# Patient Record
Sex: MALE | Race: WHITE | NOT HISPANIC OR LATINO | URBAN - METROPOLITAN AREA
[De-identification: names, ages, dates, MRNs, and addresses within clinical notes are randomized per-mention and may not be internally consistent; named-entity substitution may affect disease eponyms.]

---

## 2017-06-12 ENCOUNTER — OUTPATIENT (OUTPATIENT)
Dept: OUTPATIENT SERVICES | Facility: HOSPITAL | Age: 62
LOS: 1 days | End: 2017-06-12

## 2017-06-28 ENCOUNTER — EMERGENCY (EMERGENCY)
Facility: HOSPITAL | Age: 62
LOS: 1 days | End: 2017-06-28
Payer: MEDICARE

## 2017-06-28 PROCEDURE — 71010: CPT | Mod: 26

## 2017-06-28 PROCEDURE — 71250 CT THORAX DX C-: CPT | Mod: 26

## 2017-06-28 PROCEDURE — 99285 EMERGENCY DEPT VISIT HI MDM: CPT

## 2018-01-10 ENCOUNTER — OUTPATIENT (OUTPATIENT)
Dept: OUTPATIENT SERVICES | Facility: HOSPITAL | Age: 63
LOS: 1 days | End: 2018-01-10

## 2018-01-10 ENCOUNTER — INPATIENT (INPATIENT)
Facility: HOSPITAL | Age: 63
LOS: 15 days | Discharge: HOME | End: 2018-01-26
Payer: MEDICARE

## 2018-01-10 PROCEDURE — 99285 EMERGENCY DEPT VISIT HI MDM: CPT

## 2018-01-10 PROCEDURE — 71045 X-RAY EXAM CHEST 1 VIEW: CPT | Mod: 26

## 2018-01-11 ENCOUNTER — OUTPATIENT (OUTPATIENT)
Dept: OUTPATIENT SERVICES | Facility: HOSPITAL | Age: 63
LOS: 1 days | End: 2018-01-11

## 2018-01-12 ENCOUNTER — OUTPATIENT (OUTPATIENT)
Dept: OUTPATIENT SERVICES | Facility: HOSPITAL | Age: 63
LOS: 1 days | End: 2018-01-12

## 2018-01-12 PROCEDURE — 74230 X-RAY XM SWLNG FUNCJ C+: CPT | Mod: 26

## 2018-01-14 ENCOUNTER — OUTPATIENT (OUTPATIENT)
Dept: OUTPATIENT SERVICES | Facility: HOSPITAL | Age: 63
LOS: 1 days | End: 2018-01-14

## 2018-01-15 ENCOUNTER — OUTPATIENT (OUTPATIENT)
Dept: OUTPATIENT SERVICES | Facility: HOSPITAL | Age: 63
LOS: 1 days | End: 2018-01-15

## 2018-01-16 ENCOUNTER — OUTPATIENT (OUTPATIENT)
Dept: OUTPATIENT SERVICES | Facility: HOSPITAL | Age: 63
LOS: 1 days | End: 2018-01-16

## 2018-01-17 ENCOUNTER — OUTPATIENT (OUTPATIENT)
Dept: OUTPATIENT SERVICES | Facility: HOSPITAL | Age: 63
LOS: 1 days | End: 2018-01-17

## 2018-01-18 ENCOUNTER — OUTPATIENT (OUTPATIENT)
Dept: OUTPATIENT SERVICES | Facility: HOSPITAL | Age: 63
LOS: 1 days | End: 2018-01-18

## 2018-01-19 ENCOUNTER — OUTPATIENT (OUTPATIENT)
Dept: OUTPATIENT SERVICES | Facility: HOSPITAL | Age: 63
LOS: 1 days | End: 2018-01-19

## 2018-01-20 ENCOUNTER — OUTPATIENT (OUTPATIENT)
Dept: OUTPATIENT SERVICES | Facility: HOSPITAL | Age: 63
LOS: 1 days | End: 2018-01-20

## 2018-01-21 ENCOUNTER — OUTPATIENT (OUTPATIENT)
Dept: OUTPATIENT SERVICES | Facility: HOSPITAL | Age: 63
LOS: 1 days | End: 2018-01-21

## 2018-01-22 ENCOUNTER — OUTPATIENT (OUTPATIENT)
Dept: OUTPATIENT SERVICES | Facility: HOSPITAL | Age: 63
LOS: 1 days | End: 2018-01-22

## 2018-01-22 PROCEDURE — 71045 X-RAY EXAM CHEST 1 VIEW: CPT | Mod: 26

## 2018-01-23 ENCOUNTER — OUTPATIENT (OUTPATIENT)
Dept: OUTPATIENT SERVICES | Facility: HOSPITAL | Age: 63
LOS: 1 days | End: 2018-01-23

## 2018-01-24 ENCOUNTER — OUTPATIENT (OUTPATIENT)
Dept: OUTPATIENT SERVICES | Facility: HOSPITAL | Age: 63
LOS: 1 days | End: 2018-01-24

## 2018-01-25 ENCOUNTER — OUTPATIENT (OUTPATIENT)
Dept: OUTPATIENT SERVICES | Facility: HOSPITAL | Age: 63
LOS: 1 days | End: 2018-01-25

## 2018-05-08 ENCOUNTER — INPATIENT (INPATIENT)
Facility: HOSPITAL | Age: 63
LOS: 6 days | Discharge: HOME | End: 2018-05-15
Payer: MEDICARE

## 2018-05-08 ENCOUNTER — OUTPATIENT (OUTPATIENT)
Dept: OUTPATIENT SERVICES | Facility: HOSPITAL | Age: 63
LOS: 1 days | End: 2018-05-08

## 2018-05-08 PROCEDURE — 71260 CT THORAX DX C+: CPT | Mod: 26

## 2018-05-08 PROCEDURE — 99285 EMERGENCY DEPT VISIT HI MDM: CPT

## 2018-05-08 PROCEDURE — 71045 X-RAY EXAM CHEST 1 VIEW: CPT | Mod: 26

## 2018-05-08 PROCEDURE — 74177 CT ABD & PELVIS W/CONTRAST: CPT | Mod: 26

## 2018-05-09 ENCOUNTER — OUTPATIENT (OUTPATIENT)
Dept: OUTPATIENT SERVICES | Facility: HOSPITAL | Age: 63
LOS: 1 days | End: 2018-05-09

## 2018-05-09 PROCEDURE — 71045 X-RAY EXAM CHEST 1 VIEW: CPT | Mod: 26

## 2018-05-10 ENCOUNTER — OUTPATIENT (OUTPATIENT)
Dept: OUTPATIENT SERVICES | Facility: HOSPITAL | Age: 63
LOS: 1 days | End: 2018-05-10

## 2018-05-11 ENCOUNTER — OUTPATIENT (OUTPATIENT)
Dept: OUTPATIENT SERVICES | Facility: HOSPITAL | Age: 63
LOS: 1 days | End: 2018-05-11

## 2018-05-11 PROCEDURE — 76937 US GUIDE VASCULAR ACCESS: CPT | Mod: 26

## 2018-05-11 PROCEDURE — 36569 INSJ PICC 5 YR+ W/O IMAGING: CPT

## 2018-05-12 ENCOUNTER — OUTPATIENT (OUTPATIENT)
Dept: OUTPATIENT SERVICES | Facility: HOSPITAL | Age: 63
LOS: 1 days | End: 2018-05-12

## 2018-05-13 ENCOUNTER — OUTPATIENT (OUTPATIENT)
Dept: OUTPATIENT SERVICES | Facility: HOSPITAL | Age: 63
LOS: 1 days | End: 2018-05-13

## 2018-05-14 ENCOUNTER — OUTPATIENT (OUTPATIENT)
Dept: OUTPATIENT SERVICES | Facility: HOSPITAL | Age: 63
LOS: 1 days | End: 2018-05-14

## 2018-05-18 ENCOUNTER — INPATIENT (INPATIENT)
Facility: HOSPITAL | Age: 63
LOS: 4 days | Discharge: HOME | End: 2018-05-23
Payer: MEDICARE

## 2018-05-18 ENCOUNTER — OUTPATIENT (OUTPATIENT)
Dept: OUTPATIENT SERVICES | Facility: HOSPITAL | Age: 63
LOS: 1 days | End: 2018-05-18

## 2018-05-18 PROCEDURE — 71045 X-RAY EXAM CHEST 1 VIEW: CPT | Mod: 26

## 2018-05-18 PROCEDURE — 74176 CT ABD & PELVIS W/O CONTRAST: CPT | Mod: 26

## 2018-05-18 PROCEDURE — 74019 RADEX ABDOMEN 2 VIEWS: CPT | Mod: 26

## 2018-05-18 PROCEDURE — 99285 EMERGENCY DEPT VISIT HI MDM: CPT

## 2018-05-19 ENCOUNTER — OUTPATIENT (OUTPATIENT)
Dept: OUTPATIENT SERVICES | Facility: HOSPITAL | Age: 63
LOS: 1 days | End: 2018-05-19

## 2018-05-20 ENCOUNTER — OUTPATIENT (OUTPATIENT)
Dept: OUTPATIENT SERVICES | Facility: HOSPITAL | Age: 63
LOS: 1 days | End: 2018-05-20

## 2018-05-21 ENCOUNTER — OUTPATIENT (OUTPATIENT)
Dept: OUTPATIENT SERVICES | Facility: HOSPITAL | Age: 63
LOS: 1 days | End: 2018-05-21

## 2018-05-21 PROCEDURE — 74241: CPT | Mod: 26

## 2018-05-22 ENCOUNTER — OUTPATIENT (OUTPATIENT)
Dept: OUTPATIENT SERVICES | Facility: HOSPITAL | Age: 63
LOS: 1 days | End: 2018-05-22

## 2018-05-23 ENCOUNTER — OUTPATIENT (OUTPATIENT)
Dept: OUTPATIENT SERVICES | Facility: HOSPITAL | Age: 63
LOS: 1 days | End: 2018-05-23

## 2018-05-25 ENCOUNTER — OUTPATIENT (OUTPATIENT)
Dept: OUTPATIENT SERVICES | Facility: HOSPITAL | Age: 63
LOS: 1 days | End: 2018-05-25

## 2018-05-25 ENCOUNTER — EMERGENCY (EMERGENCY)
Facility: HOSPITAL | Age: 63
LOS: 1 days | End: 2018-05-25
Payer: MEDICARE

## 2018-05-25 PROCEDURE — 71260 CT THORAX DX C+: CPT | Mod: 26

## 2018-05-25 PROCEDURE — 99285 EMERGENCY DEPT VISIT HI MDM: CPT | Mod: GC

## 2018-05-25 PROCEDURE — 71045 X-RAY EXAM CHEST 1 VIEW: CPT | Mod: 26

## 2018-05-25 PROCEDURE — 74177 CT ABD & PELVIS W/CONTRAST: CPT | Mod: 26

## 2018-05-26 ENCOUNTER — INPATIENT (INPATIENT)
Facility: HOSPITAL | Age: 63
LOS: 3 days | Discharge: NURSING, REHAB OR LONG-TERM CARE | DRG: 177 | End: 2018-05-30
Attending: HOSPITALIST | Admitting: HOSPITALIST
Payer: MEDICARE

## 2018-05-26 VITALS
OXYGEN SATURATION: 100 % | HEART RATE: 130 BPM | RESPIRATION RATE: 22 BRPM | DIASTOLIC BLOOD PRESSURE: 110 MMHG | SYSTOLIC BLOOD PRESSURE: 172 MMHG

## 2018-05-26 DIAGNOSIS — A41.9 SEPSIS, UNSPECIFIED ORGANISM: ICD-10-CM

## 2018-05-26 DIAGNOSIS — K92.2 GASTROINTESTINAL HEMORRHAGE, UNSPECIFIED: ICD-10-CM

## 2018-05-26 DIAGNOSIS — K59.00 CONSTIPATION, UNSPECIFIED: ICD-10-CM

## 2018-05-26 DIAGNOSIS — E83.39 OTHER DISORDERS OF PHOSPHORUS METABOLISM: ICD-10-CM

## 2018-05-26 DIAGNOSIS — F79 UNSPECIFIED INTELLECTUAL DISABILITIES: ICD-10-CM

## 2018-05-26 DIAGNOSIS — J69.0 PNEUMONITIS DUE TO INHALATION OF FOOD AND VOMIT: ICD-10-CM

## 2018-05-26 DIAGNOSIS — N17.9 ACUTE KIDNEY FAILURE, UNSPECIFIED: ICD-10-CM

## 2018-05-26 DIAGNOSIS — K94.23 GASTROSTOMY MALFUNCTION: Chronic | ICD-10-CM

## 2018-05-26 DIAGNOSIS — E83.51 HYPOCALCEMIA: ICD-10-CM

## 2018-05-26 DIAGNOSIS — T17.908A UNSPECIFIED FOREIGN BODY IN RESPIRATORY TRACT, PART UNSPECIFIED CAUSING OTHER INJURY, INITIAL ENCOUNTER: ICD-10-CM

## 2018-05-26 DIAGNOSIS — E87.6 HYPOKALEMIA: ICD-10-CM

## 2018-05-26 LAB
ABO RH CONFIRMATION: SIGNIFICANT CHANGE UP
ALBUMIN SERPL ELPH-MCNC: 2.6 G/DL — LOW (ref 3.3–5.2)
ALBUMIN SERPL ELPH-MCNC: 3.4 G/DL — SIGNIFICANT CHANGE UP (ref 3.3–5.2)
ALP SERPL-CCNC: 60 U/L — SIGNIFICANT CHANGE UP (ref 40–120)
ALP SERPL-CCNC: 82 U/L — SIGNIFICANT CHANGE UP (ref 40–120)
ALT FLD-CCNC: 10 U/L — SIGNIFICANT CHANGE UP
ALT FLD-CCNC: 8 U/L — SIGNIFICANT CHANGE UP
ANION GAP SERPL CALC-SCNC: 11 MMOL/L — SIGNIFICANT CHANGE UP (ref 5–17)
ANION GAP SERPL CALC-SCNC: 16 MMOL/L — SIGNIFICANT CHANGE UP (ref 5–17)
APTT BLD: 23.3 SEC — LOW (ref 27.5–37.4)
APTT BLD: 28.6 SEC — SIGNIFICANT CHANGE UP (ref 27.5–37.4)
AST SERPL-CCNC: 10 U/L — SIGNIFICANT CHANGE UP
AST SERPL-CCNC: 12 U/L — SIGNIFICANT CHANGE UP
BILIRUB SERPL-MCNC: 0.2 MG/DL — LOW (ref 0.4–2)
BILIRUB SERPL-MCNC: 0.3 MG/DL — LOW (ref 0.4–2)
BLD GP AB SCN SERPL QL: SIGNIFICANT CHANGE UP
BUN SERPL-MCNC: 13 MG/DL — SIGNIFICANT CHANGE UP (ref 8–20)
BUN SERPL-MCNC: 23 MG/DL — HIGH (ref 8–20)
CALCIUM SERPL-MCNC: 6.2 MG/DL — CRITICAL LOW (ref 8.6–10.2)
CALCIUM SERPL-MCNC: 8.1 MG/DL — LOW (ref 8.6–10.2)
CHLORIDE SERPL-SCNC: 100 MMOL/L — SIGNIFICANT CHANGE UP (ref 98–107)
CHLORIDE SERPL-SCNC: 112 MMOL/L — HIGH (ref 98–107)
CO2 SERPL-SCNC: 18 MMOL/L — LOW (ref 22–29)
CO2 SERPL-SCNC: 22 MMOL/L — SIGNIFICANT CHANGE UP (ref 22–29)
CREAT SERPL-MCNC: 0.32 MG/DL — LOW (ref 0.5–1.3)
CREAT SERPL-MCNC: 0.42 MG/DL — LOW (ref 0.5–1.3)
EOSINOPHIL # BLD AUTO: 0 K/UL — SIGNIFICANT CHANGE UP (ref 0–0.5)
EOSINOPHIL # BLD AUTO: 0.2 K/UL — SIGNIFICANT CHANGE UP (ref 0–0.5)
EOSINOPHIL NFR BLD AUTO: 0.4 % — SIGNIFICANT CHANGE UP (ref 0–5)
EOSINOPHIL NFR BLD AUTO: 1.9 % — SIGNIFICANT CHANGE UP (ref 0–5)
GLUCOSE SERPL-MCNC: 128 MG/DL — HIGH (ref 70–115)
GLUCOSE SERPL-MCNC: 92 MG/DL — SIGNIFICANT CHANGE UP (ref 70–115)
HCT VFR BLD CALC: 26 % — LOW (ref 42–52)
HCT VFR BLD CALC: 31.7 % — LOW (ref 42–52)
HGB BLD-MCNC: 10 G/DL — LOW (ref 14–18)
HGB BLD-MCNC: 8.2 G/DL — LOW (ref 14–18)
INR BLD: 1.17 RATIO — HIGH (ref 0.88–1.16)
INR BLD: 1.28 RATIO — HIGH (ref 0.88–1.16)
LACTATE BLDV-MCNC: 1.7 MMOL/L — SIGNIFICANT CHANGE UP (ref 0.5–2)
LYMPHOCYTES # BLD AUTO: 1.2 K/UL — SIGNIFICANT CHANGE UP (ref 1–4.8)
LYMPHOCYTES # BLD AUTO: 1.3 K/UL — SIGNIFICANT CHANGE UP (ref 1–4.8)
LYMPHOCYTES # BLD AUTO: 13.3 % — LOW (ref 20–55)
LYMPHOCYTES # BLD AUTO: 13.3 % — LOW (ref 20–55)
MAGNESIUM SERPL-MCNC: 1.3 MG/DL — LOW (ref 1.6–2.6)
MCHC RBC-ENTMCNC: 27.5 PG — SIGNIFICANT CHANGE UP (ref 27–31)
MCHC RBC-ENTMCNC: 27.6 PG — SIGNIFICANT CHANGE UP (ref 27–31)
MCHC RBC-ENTMCNC: 31.5 G/DL — LOW (ref 32–36)
MCHC RBC-ENTMCNC: 31.5 G/DL — LOW (ref 32–36)
MCV RBC AUTO: 87.3 FL — SIGNIFICANT CHANGE UP (ref 80–94)
MCV RBC AUTO: 87.5 FL — SIGNIFICANT CHANGE UP (ref 80–94)
MONOCYTES # BLD AUTO: 1.1 K/UL — HIGH (ref 0–0.8)
MONOCYTES # BLD AUTO: 1.4 K/UL — HIGH (ref 0–0.8)
MONOCYTES NFR BLD AUTO: 12.1 % — HIGH (ref 3–10)
MONOCYTES NFR BLD AUTO: 14.9 % — HIGH (ref 3–10)
NEUTROPHILS # BLD AUTO: 6.7 K/UL — SIGNIFICANT CHANGE UP (ref 1.8–8)
NEUTROPHILS # BLD AUTO: 6.9 K/UL — SIGNIFICANT CHANGE UP (ref 1.8–8)
NEUTROPHILS NFR BLD AUTO: 71.1 % — SIGNIFICANT CHANGE UP (ref 37–73)
NEUTROPHILS NFR BLD AUTO: 72.5 % — SIGNIFICANT CHANGE UP (ref 37–73)
PHOSPHATE SERPL-MCNC: 1.4 MG/DL — LOW (ref 2.4–4.7)
PLATELET # BLD AUTO: 248 K/UL — SIGNIFICANT CHANGE UP (ref 150–400)
PLATELET # BLD AUTO: 314 K/UL — SIGNIFICANT CHANGE UP (ref 150–400)
POTASSIUM SERPL-MCNC: 3 MMOL/L — LOW (ref 3.5–5.3)
POTASSIUM SERPL-MCNC: 3.7 MMOL/L — SIGNIFICANT CHANGE UP (ref 3.5–5.3)
POTASSIUM SERPL-SCNC: 3 MMOL/L — LOW (ref 3.5–5.3)
POTASSIUM SERPL-SCNC: 3.7 MMOL/L — SIGNIFICANT CHANGE UP (ref 3.5–5.3)
PROCALCITONIN SERPL-MCNC: <0.05 NG/ML — SIGNIFICANT CHANGE UP (ref 0–0.04)
PROT SERPL-MCNC: 4.5 G/DL — LOW (ref 6.6–8.7)
PROT SERPL-MCNC: 6.1 G/DL — LOW (ref 6.6–8.7)
PROTHROM AB SERPL-ACNC: 12.9 SEC — HIGH (ref 9.8–12.7)
PROTHROM AB SERPL-ACNC: 14.2 SEC — HIGH (ref 9.8–12.7)
RAPID RVP RESULT: SIGNIFICANT CHANGE UP
RBC # BLD: 2.97 M/UL — LOW (ref 4.6–6.2)
RBC # BLD: 3.63 M/UL — LOW (ref 4.6–6.2)
RBC # FLD: 14.6 % — SIGNIFICANT CHANGE UP (ref 11–15.6)
RBC # FLD: 14.6 % — SIGNIFICANT CHANGE UP (ref 11–15.6)
SODIUM SERPL-SCNC: 138 MMOL/L — SIGNIFICANT CHANGE UP (ref 135–145)
SODIUM SERPL-SCNC: 141 MMOL/L — SIGNIFICANT CHANGE UP (ref 135–145)
TYPE + AB SCN PNL BLD: SIGNIFICANT CHANGE UP
WBC # BLD: 9.2 K/UL — SIGNIFICANT CHANGE UP (ref 4.8–10.8)
WBC # BLD: 9.7 K/UL — SIGNIFICANT CHANGE UP (ref 4.8–10.8)
WBC # FLD AUTO: 9.2 K/UL — SIGNIFICANT CHANGE UP (ref 4.8–10.8)
WBC # FLD AUTO: 9.7 K/UL — SIGNIFICANT CHANGE UP (ref 4.8–10.8)

## 2018-05-26 PROCEDURE — 99223 1ST HOSP IP/OBS HIGH 75: CPT

## 2018-05-26 PROCEDURE — 36556 INSERT NON-TUNNEL CV CATH: CPT

## 2018-05-26 PROCEDURE — 93010 ELECTROCARDIOGRAM REPORT: CPT

## 2018-05-26 PROCEDURE — 12345: CPT | Mod: NC

## 2018-05-26 PROCEDURE — 99291 CRITICAL CARE FIRST HOUR: CPT | Mod: 25

## 2018-05-26 PROCEDURE — 71045 X-RAY EXAM CHEST 1 VIEW: CPT | Mod: 26

## 2018-05-26 RX ORDER — CHOLECALCIFEROL (VITAMIN D3) 125 MCG
1000 CAPSULE ORAL DAILY
Qty: 0 | Refills: 0 | Status: DISCONTINUED | OUTPATIENT
Start: 2018-05-26 | End: 2018-05-30

## 2018-05-26 RX ORDER — LAMOTRIGINE 25 MG/1
100 TABLET, ORALLY DISINTEGRATING ORAL DAILY
Qty: 0 | Refills: 0 | Status: DISCONTINUED | OUTPATIENT
Start: 2018-05-26 | End: 2018-05-30

## 2018-05-26 RX ORDER — SODIUM CHLORIDE 9 MG/ML
2000 INJECTION INTRAMUSCULAR; INTRAVENOUS; SUBCUTANEOUS ONCE
Qty: 0 | Refills: 0 | Status: COMPLETED | OUTPATIENT
Start: 2018-05-26 | End: 2018-05-26

## 2018-05-26 RX ORDER — SODIUM CHLORIDE 9 MG/ML
1000 INJECTION INTRAMUSCULAR; INTRAVENOUS; SUBCUTANEOUS ONCE
Qty: 0 | Refills: 0 | Status: COMPLETED | OUTPATIENT
Start: 2018-05-26 | End: 2018-05-26

## 2018-05-26 RX ORDER — IPRATROPIUM/ALBUTEROL SULFATE 18-103MCG
3 AEROSOL WITH ADAPTER (GRAM) INHALATION EVERY 6 HOURS
Qty: 0 | Refills: 0 | Status: DISCONTINUED | OUTPATIENT
Start: 2018-05-26 | End: 2018-05-30

## 2018-05-26 RX ORDER — POTASSIUM CHLORIDE 20 MEQ
40 PACKET (EA) ORAL EVERY 4 HOURS
Qty: 0 | Refills: 0 | Status: COMPLETED | OUTPATIENT
Start: 2018-05-26 | End: 2018-05-26

## 2018-05-26 RX ORDER — MAGNESIUM SULFATE 500 MG/ML
2 VIAL (ML) INJECTION EVERY 4 HOURS
Qty: 0 | Refills: 0 | Status: COMPLETED | OUTPATIENT
Start: 2018-05-26 | End: 2018-05-26

## 2018-05-26 RX ORDER — LAMOTRIGINE 25 MG/1
50 TABLET, ORALLY DISINTEGRATING ORAL AT BEDTIME
Qty: 0 | Refills: 0 | Status: DISCONTINUED | OUTPATIENT
Start: 2018-05-26 | End: 2018-05-30

## 2018-05-26 RX ORDER — DOCUSATE SODIUM 100 MG
200 CAPSULE ORAL
Qty: 0 | Refills: 0 | Status: DISCONTINUED | OUTPATIENT
Start: 2018-05-26 | End: 2018-05-26

## 2018-05-26 RX ORDER — SENNA PLUS 8.6 MG/1
2 TABLET ORAL AT BEDTIME
Qty: 0 | Refills: 0 | Status: DISCONTINUED | OUTPATIENT
Start: 2018-05-26 | End: 2018-05-30

## 2018-05-26 RX ORDER — ASCORBIC ACID 60 MG
500 TABLET,CHEWABLE ORAL DAILY
Qty: 0 | Refills: 0 | Status: DISCONTINUED | OUTPATIENT
Start: 2018-05-26 | End: 2018-05-30

## 2018-05-26 RX ORDER — SODIUM,POTASSIUM PHOSPHATES 278-250MG
1 POWDER IN PACKET (EA) ORAL ONCE
Qty: 0 | Refills: 0 | Status: COMPLETED | OUTPATIENT
Start: 2018-05-26 | End: 2018-05-26

## 2018-05-26 RX ORDER — FERROUS SULFATE 325(65) MG
300 TABLET ORAL DAILY
Qty: 0 | Refills: 0 | Status: DISCONTINUED | OUTPATIENT
Start: 2018-05-26 | End: 2018-05-30

## 2018-05-26 RX ORDER — POLYETHYLENE GLYCOL 3350 17 G/17G
17 POWDER, FOR SOLUTION ORAL DAILY
Qty: 0 | Refills: 0 | Status: DISCONTINUED | OUTPATIENT
Start: 2018-05-26 | End: 2018-05-30

## 2018-05-26 RX ORDER — SODIUM CHLORIDE 9 MG/ML
1000 INJECTION, SOLUTION INTRAVENOUS
Qty: 0 | Refills: 0 | Status: DISCONTINUED | OUTPATIENT
Start: 2018-05-26 | End: 2018-05-30

## 2018-05-26 RX ORDER — PHENOBARBITAL 60 MG
32.4 TABLET ORAL
Qty: 0 | Refills: 0 | Status: DISCONTINUED | OUTPATIENT
Start: 2018-05-26 | End: 2018-05-30

## 2018-05-26 RX ORDER — LACTOBACILLUS ACIDOPHILUS 100MM CELL
1 CAPSULE ORAL DAILY
Qty: 0 | Refills: 0 | Status: DISCONTINUED | OUTPATIENT
Start: 2018-05-26 | End: 2018-05-30

## 2018-05-26 RX ORDER — ONDANSETRON 8 MG/1
4 TABLET, FILM COATED ORAL EVERY 6 HOURS
Qty: 0 | Refills: 0 | Status: DISCONTINUED | OUTPATIENT
Start: 2018-05-26 | End: 2018-05-30

## 2018-05-26 RX ORDER — PANTOPRAZOLE SODIUM 20 MG/1
8 TABLET, DELAYED RELEASE ORAL
Qty: 80 | Refills: 0 | Status: DISCONTINUED | OUTPATIENT
Start: 2018-05-26 | End: 2018-05-27

## 2018-05-26 RX ORDER — CALCIUM CARBONATE 500(1250)
1250 TABLET ORAL THREE TIMES A DAY
Qty: 0 | Refills: 0 | Status: DISCONTINUED | OUTPATIENT
Start: 2018-05-26 | End: 2018-05-30

## 2018-05-26 RX ORDER — ACETAMINOPHEN 500 MG
650 TABLET ORAL EVERY 6 HOURS
Qty: 0 | Refills: 0 | Status: DISCONTINUED | OUTPATIENT
Start: 2018-05-26 | End: 2018-05-30

## 2018-05-26 RX ADMIN — Medication 100 MILLIGRAM(S): at 06:11

## 2018-05-26 RX ADMIN — Medication 500 MILLIGRAM(S): at 12:55

## 2018-05-26 RX ADMIN — Medication 50 GRAM(S): at 12:39

## 2018-05-26 RX ADMIN — Medication 3 MILLILITER(S): at 20:53

## 2018-05-26 RX ADMIN — SODIUM CHLORIDE 1000 MILLILITER(S): 9 INJECTION INTRAMUSCULAR; INTRAVENOUS; SUBCUTANEOUS at 02:34

## 2018-05-26 RX ADMIN — LAMOTRIGINE 50 MILLIGRAM(S): 25 TABLET, ORALLY DISINTEGRATING ORAL at 23:05

## 2018-05-26 RX ADMIN — Medication 32.4 MILLIGRAM(S): at 17:46

## 2018-05-26 RX ADMIN — Medication 1250 MILLIGRAM(S): at 13:14

## 2018-05-26 RX ADMIN — SENNA PLUS 2 TABLET(S): 8.6 TABLET ORAL at 23:07

## 2018-05-26 RX ADMIN — Medication 100 MILLIGRAM(S): at 13:09

## 2018-05-26 RX ADMIN — Medication 3 MILLILITER(S): at 16:23

## 2018-05-26 RX ADMIN — Medication 50 GRAM(S): at 17:40

## 2018-05-26 RX ADMIN — LAMOTRIGINE 100 MILLIGRAM(S): 25 TABLET, ORALLY DISINTEGRATING ORAL at 12:55

## 2018-05-26 RX ADMIN — Medication 1 TABLET(S): at 12:55

## 2018-05-26 RX ADMIN — PANTOPRAZOLE SODIUM 10 MG/HR: 20 TABLET, DELAYED RELEASE ORAL at 16:23

## 2018-05-26 RX ADMIN — Medication 40 MILLIEQUIVALENT(S): at 13:20

## 2018-05-26 RX ADMIN — Medication 1000 UNIT(S): at 16:26

## 2018-05-26 RX ADMIN — Medication 1250 MILLIGRAM(S): at 23:06

## 2018-05-26 RX ADMIN — Medication 3 MILLILITER(S): at 09:01

## 2018-05-26 RX ADMIN — Medication 40 MILLIEQUIVALENT(S): at 23:12

## 2018-05-26 RX ADMIN — Medication 300 MILLIGRAM(S): at 12:51

## 2018-05-26 RX ADMIN — PANTOPRAZOLE SODIUM 10 MG/HR: 20 TABLET, DELAYED RELEASE ORAL at 04:47

## 2018-05-26 RX ADMIN — Medication 1 TABLET(S): at 12:58

## 2018-05-26 RX ADMIN — Medication 100 MILLIGRAM(S): at 23:06

## 2018-05-26 RX ADMIN — SODIUM CHLORIDE 125 MILLILITER(S): 9 INJECTION, SOLUTION INTRAVENOUS at 11:30

## 2018-05-26 RX ADMIN — POLYETHYLENE GLYCOL 3350 17 GRAM(S): 17 POWDER, FOR SOLUTION ORAL at 12:50

## 2018-05-26 RX ADMIN — Medication 32.4 MILLIGRAM(S): at 06:34

## 2018-05-26 RX ADMIN — Medication 40 MILLIEQUIVALENT(S): at 17:46

## 2018-05-26 RX ADMIN — SODIUM CHLORIDE 2000 MILLILITER(S): 9 INJECTION INTRAMUSCULAR; INTRAVENOUS; SUBCUTANEOUS at 04:47

## 2018-05-26 RX ADMIN — Medication 1 TABLET(S): at 12:54

## 2018-05-26 NOTE — H&P ADULT - PROBLEM SELECTOR PLAN 1
admit to monitored +  lactate now <2, cont IVF hydration  tachycardia resolved, cont monitor  EKG reviewed, no STT elevations  pt placed on clindamycin for concern of aspiration PNA  fall and seizure precautions  blood, urine, sputum cultures  RVP ordered

## 2018-05-26 NOTE — H&P ADULT - HISTORY OF PRESENT ILLNESS
Pt is a 61 y/o Male with pmh/o non-verbal and handicap at baseline, who lives at group home and is accompanied by aide, who presents to ED as a transfer from [facility] after pt experienced dark colored, concern for bloody coffee ground emesis which was also note from PEG tube. History taken largely from ED physician who reports one additonal episode in ED. As per aide at bedside, pt never deviated from baseline mentation or behaviors. In ED prior to transfer pt received IV levaquin and zosyn and IVF, lactate originally 4.5, now to 3.5 and less than 2 in [facility] ED after additonal IVF. At [place] CT A/P performed and showed no acute intra-abdominal pathology however did show bibasilar consolidations. Aide at bedside notes concern for aspiration of emesis during episode which preceded ED visit. Pt arrived tachycardic in 120's with fever both which resolved after IVF and abx. Pt is a 61 y/o Male with pmh/o non-verbal and handicap at baseline, CP, MR, GERD, with two recent admissions for gastritis/PNA at [place], who lives at group home and is accompanied by aide, who presents to ED as a transfer from [facility] after pt experienced dark colored, concern for bloody coffee ground emesis which was also note from PEG tube. History taken largely from ED physician who reports one additional episode in ED. As per aide at bedside, pt never deviated from baseline mentation or behaviors. In ED prior to transfer pt received IV levaquin and zosyn and IVF, lactate originally 4.5, now to 3.5 and less than 2 in [facility] ED after additional IVF. At [facility] CT A/P performed and showed no acute intra-abdominal pathology however did show bibasilar consolidations. Aide at bedside notes concern for aspiration of emesis during episode which preceded ED visit. Pt arrived tachycardic in 120's with fever both which resolved after IVF and abx.

## 2018-05-26 NOTE — ED PROCEDURE NOTE - CPROC ED INFORMED CONSENT1
with son of patient/Benefits, risks, and possible complications of procedure explained to patient/caregiver who verbalized understanding and gave verbal consent.

## 2018-05-26 NOTE — ED PROVIDER NOTE - MUSCULOSKELETAL, MLM
Spine appears normal, range of motion is not limited, patient is moving all extremities. LE are contracted.

## 2018-05-26 NOTE — ED PROVIDER NOTE - UNABLE TO OBTAIN
Unable to obtain complete hx because patient is non-verbal at baseline Severe Illness/Injury Unable to contain complete ROS because patient is non-verbal at baseline

## 2018-05-26 NOTE — H&P ADULT - PROBLEM SELECTOR PLAN 5
cont colace  moderate amount of stool noted on Xray in [place]  consider miralax/laxative pending GI eval

## 2018-05-26 NOTE — ED PROVIDER NOTE - CRITICAL CARE PROVIDED
direct patient care (not related to procedure)/interpretation of diagnostic studies/documentation/additional history taking/telephone consultation with the patient's family

## 2018-05-26 NOTE — H&P ADULT - PMH
Cerebral palsy, unspecified type    Constipation, unspecified constipation type    Dysphagia, unspecified type    Gastritis, presence of bleeding unspecified, unspecified chronicity, unspecified gastritis type    Mental retardation    Pneumonia due to infectious organism, unspecified laterality, unspecified part of lung

## 2018-05-26 NOTE — H&P ADULT - EXTREMITIES COMMENTS
contracted without any cellulitis, open sores/wounds, erythema  R shoulder at baseline with partial dislocation as per aide

## 2018-05-26 NOTE — CONSULT NOTE ADULT - PROBLEM SELECTOR RECOMMENDATION 9
Hemodynamically stable. NPO for now. IV Pantoprazole Not presently a candidate for EGD. Repeat labs ordered for the AM.

## 2018-05-26 NOTE — CONSULT NOTE ADULT - SUBJECTIVE AND OBJECTIVE BOX
Patient is a 62y old  Male who presents with a chief complaint of sent from [facility] for GI bleed from PEG (26 May 2018 03:41)      HPI:  Pt is a 63 y/o Male with pmh/o non-verbal and handicap at baseline, CP, MR, GERD, with two recent admissions for gastritis/PNA at [facility], who lives at group home and is accompanied by aide, who presents to ED as a transfer from [facility] after pt experienced dark colored, concern for bloody coffee ground emesis which was also note from PEG tube. History taken largely from ED physician who reports one additional episode in ED. As per aide at bedside, pt never deviated from baseline mentation or behaviors. In ED prior to transfer pt received IV levaquin and zosyn and IVF, lactate originally 4.5, now to 3.5 and less than 2 in [facility] ED after additional IVF. At [facility] CT A/P performed and showed no acute intra-abdominal pathology however did show bibasilar consolidations. Aide at bedside notes concern for aspiration of emesis during episode which preceded ED visit. Pt arrived tachycardic in 120's with fever both which resolved after IVF and abx. (26 May 2018 03:41). Unable to get hx from pt.      REVIEW OF SYSTEMS: Unobtainable    PAST MEDICAL & SURGICAL HISTORY:  Constipation, unspecified constipation type  Dysphagia, unspecified type  Pneumonia due to infectious organism, unspecified laterality, unspecified part of lung  Gastritis, presence of bleeding unspecified, unspecified chronicity, unspecified gastritis type  Mental retardation  Cerebral palsy, unspecified type, End stage  PEG tube malfunction      FAMILY HISTORY:  No pertinent family history in first degree relatives      SOCIAL HISTORY:  Smoking Status: [ ] Current, [ ] Former, [ x] Never  Pack Years: N/A, No ETOH or drug abuse history.    MEDICATIONS:  MEDICATIONS  (STANDING):  ALBUTerol/ipratropium for Nebulization 3 milliLiter(s) Nebulizer every 6 hours  ascorbic acid 500 milliGRAM(s) Oral daily  clindamycin IVPB      clindamycin IVPB 600 milliGRAM(s) IV Intermittent every 8 hours  docusate sodium 200 milliGRAM(s) Oral two times a day  ferrous    sulfate Liquid 300 milliGRAM(s) Enteral Tube daily  lactated ringers. 1000 milliLiter(s) (125 mL/Hr) IV Continuous <Continuous>  lactobacillus acidophilus 1 Tablet(s) Oral daily  lamoTRIgine 100 milliGRAM(s) Oral daily  lamoTRIgine 50 milliGRAM(s) Oral at bedtime  pantoprazole Infusion 8 mG/Hr (10 mL/Hr) IV Continuous <Continuous>  PHENobarbital 32.4 milliGRAM(s) Oral two times a day    MEDICATIONS  (PRN):  acetaminophen   Tablet 650 milliGRAM(s) Oral every 6 hours PRN For Temp greater than 38 C (100.4 F)  ondansetron Injectable 4 milliGRAM(s) IV Push every 6 hours PRN Nausea      Allergies    Fish Products (Unknown)  No Known Drug Allergies    Intolerances        Vital Signs Last 24 Hrs  T(C): 37.7 (26 May 2018 01:07), Max: 37.7 (26 May 2018 01:07)  T(F): 99.9 (26 May 2018 01:07), Max: 99.9 (26 May 2018 01:07)  HR: 120 (26 May 2018 01:55) (119 - 130)  BP: 122/91 (26 May 2018 01:55) (110/74 - 172/110)  BP(mean): --  RR: 22 (26 May 2018 01:55) (20 - 24)  SpO2: 98% (26 May 2018 01:55) (98% - 100%)        PHYSICAL EXAM:    General: Well developed; contracted, in no acute distress  HEENT: MMM, conjunctiva pink and sclera anicteric.  Lungs: Decreased breath sounds bilaterally.  Cor: RRR S1, S2 only.  Abdomen: Soft,  obese, G-tube in place, non-tender non-distended; Normal bowel sounds; No rebound or guarding or HSM.  GLORIA: Brown stool  Extremities: Normal range of motion, No clubbing, cyanosis or edema  Neurological: Alert and oriented x3  Skin: Warm and dry. No obvious rash      LABS:                        10.0   9.7   )-----------( 314      ( 26 May 2018 02:15 )             31.7     05-26    138  |  100  |  23.0<H>  ----------------------------<  128<H>  3.7   |  22.0  |  0.42<L>    Ca    8.1<L>      26 May 2018 02:15    TPro  6.1<L>  /  Alb  3.4  /  TBili  0.3<L>  /  DBili  x   /  AST  12  /  ALT  10  /  AlkPhos  82  05-26          RADIOLOGY & ADDITIONAL STUDIES:

## 2018-05-26 NOTE — H&P ADULT - ASSESSMENT
61 yo male with pmh/o gastritis and pneumonia, admitted for sepsis secondary to likely aspiration PNA with failed outpt tx on augmentin and upper GIB likely due to gastritis in setting of abx use and stress rxn.

## 2018-05-26 NOTE — ED PROCEDURE NOTE - CPROC ED INFUS LINE DETAIL1
The location was identified, and the area was draped and prepped./The guidewire was recovered./All lumen(s) aspirated and flushed without difficulty./Ultrasound guidance was used during placement.

## 2018-05-26 NOTE — ED ADULT NURSE REASSESSMENT NOTE - NS ED NURSE REASSESS COMMENT FT1
Pt arrived w/ 20G IV to left AC from previous facility, unsuccessful attempts at IV upon arrival to ED, MD Banks to place central line, pt has not vomited since arrival to ED, code team 1 remains at bedside.

## 2018-05-26 NOTE — PROGRESS NOTE ADULT - PROBLEM SELECTOR PLAN 1
No further bleeding, on protonix drip,  consider converting drip to iv push and resuming feeds in am  Monitor hgb

## 2018-05-26 NOTE — ED ADULT TRIAGE NOTE - CHIEF COMPLAINT QUOTE
pt transfer from [place], nonverbal, sent to [facility] for GI Bleed and vomiting coffee ground emesis. MD Quintanilla @ bedside for eval. code team called to bedside

## 2018-05-26 NOTE — ED PROVIDER NOTE - CARE PLAN
Principal Discharge DX:	Upper GI bleed  Secondary Diagnosis:	Aspiration into airway, initial encounter  Secondary Diagnosis:	Sepsis, due to unspecified organism

## 2018-05-26 NOTE — ED PROVIDER NOTE - OBJECTIVE STATEMENT
63 y/o M non-verbal, presents to ED c/o "coffee ground" appearing emesis which is also coming from his PEG tube. As per EMS, the group home was concerned for possible aspiration. Staff at group home reports patient has been acting appropriately and at baseline.    Patient was transferred from [place] for GI consult. 61 y/o M non-verbal and handicap at baseline, presents to ED c/o "coffee ground" appearing emesis which is also coming from his PEG tube. As per EMS, the group home was concerned for possible aspiration. Staff at group home reports patient has been acting appropriately and at baseline.    Patient was transferred from [place] for GI consult. 61 y/o M non-verbal and handicap at baseline, presents to ED c/o "coffee ground" appearing emesis which is also coming from his PEG tube. As per EMS, the group home was concerned for possible aspiration. Staff at group home reports patient has been acting appropriately and at baseline.  Pt received levaquin and zosyn at [place]. [place] initally 4.5, went to 3.5 with IVF. CT A/P showed no abd pathology but bibasilar infiltrates R>L consistent with aspiration.    Patient was transferred from [place] for GI consult.

## 2018-05-26 NOTE — PROGRESS NOTE ADULT - PROBLEM SELECTOR PLAN 4
Assess ionized calcium, ekg stat to evaluate for qt prolongation, pth and vitamin d levels,   replace calcium and repeat level in am.

## 2018-05-26 NOTE — PROGRESS NOTE ADULT - SUBJECTIVE AND OBJECTIVE BOX
MARINA LEAVITT     Chief Complaint: Patient is a 62y old  Male who presents with a chief complaint of sent from [place] for GI bleed from PEG (26 May 2018 03:41)      PAST MEDICAL & SURGICAL HISTORY:  Constipation, unspecified constipation type  Dysphagia, unspecified type  Pneumonia due to infectious organism, unspecified laterality, unspecified part of lung  Gastritis, presence of bleeding unspecified, unspecified chronicity, unspecified gastritis type  Mental retardation  Cerebral palsy, unspecified type  PEG tube malfunction      HPI/OVERNIGHT EVENTS: Patient non verbal. Labs consistent with profound hypocalcemia, hypomagnesemia, hypokalemia.  EKG stat and aggressive replacement ordered.    MEDICATIONS  (STANDING):  ALBUTerol/ipratropium for Nebulization 3 milliLiter(s) Nebulizer every 6 hours  ascorbic acid 500 milliGRAM(s) Oral daily  calcium carbonate Suspension 500 milliGRAM(s) Oral three times a day  clindamycin IVPB      clindamycin IVPB 600 milliGRAM(s) IV Intermittent every 8 hours  ferrous    sulfate Liquid 300 milliGRAM(s) Enteral Tube daily  lactated ringers. 1000 milliLiter(s) (125 mL/Hr) IV Continuous <Continuous>  lactobacillus acidophilus 1 Tablet(s) Oral daily  lamoTRIgine 100 milliGRAM(s) Oral daily  lamoTRIgine 50 milliGRAM(s) Oral at bedtime  magnesium sulfate  IVPB 2 Gram(s) IV Intermittent every 4 hours  pantoprazole Infusion 8 mG/Hr (10 mL/Hr) IV Continuous <Continuous>  PHENobarbital 32.4 milliGRAM(s) Oral two times a day  polyethylene glycol 3350 17 Gram(s) Oral daily  potassium acid phosphate/sodium acid phosphate tablet (K-PHOS No. 2) 1 Tablet(s) Oral once  potassium chloride   Powder 40 milliEquivalent(s) Oral every 4 hours  senna 2 Tablet(s) Oral at bedtime      Vital Signs Last 24 Hrs  T(C): 36.7 (26 May 2018 08:30), Max: 37.7 (26 May 2018 01:07)  T(F): 98.1 (26 May 2018 08:30), Max: 99.9 (26 May 2018 01:07)  HR: 105 (26 May 2018 09:01) (99 - 130)  BP: 124/72 (26 May 2018 08:30) (110/74 - 172/110)  BP(mean): --  RR: 19 (26 May 2018 08:30) (19 - 24)  SpO2: 99% (26 May 2018 09:01) (98% - 100%)    PHYSICAL EXAM:  Constitutional: mentally challenged  HEENT: PERRLA, EOMI, Normal Hearing, MMM  Neck: No LAD, No JVD  Back: Normal spine flexure, No CVA tenderness  Respiratory: CTAB Cardiovascular: S1 and S2, RRR, no M/G/R  Gastrointestinal: BS+, soft, NT/ND  Extremities: No peripheral edema  Vascular: 2+ peripheral pulses  Neurological: non verbal, does not follow commands.     CAPILLARY BLOOD GLUCOSE    LABS:                        8.2    9.2   )-----------( 248      ( 26 May 2018 09:18 )             26.0     05-26    141  |  112<H>  |  13.0  ----------------------------<  92  3.0<L>   |  18.0<L>  |  0.32<L>    Ca    6.2<LL>      26 May 2018 09:18  Phos  1.4     05-26  Mg     1.3     05-26    TPro  4.5<L>  /  Alb  2.6<L>  /  TBili  0.2<L>  /  DBili  x   /  AST  10  /  ALT  8   /  AlkPhos  60  05-26    PT/INR - ( 26 May 2018 02:15 )   PT: 12.9 sec;   INR: 1.17 ratio         PTT - ( 26 May 2018 02:15 )  PTT:28.6 sec      RADIOLOGY & ADDITIONAL TESTS: MARINA LEAVITT     Chief Complaint: Patient is a 62y old  Male who presents with a chief complaint of sent from [place] for GI bleed from PEG (26 May 2018 03:41)      PAST MEDICAL & SURGICAL HISTORY:  Constipation, unspecified constipation type  Dysphagia, unspecified type  Pneumonia due to infectious organism, unspecified laterality, unspecified part of lung  Gastritis, presence of bleeding unspecified, unspecified chronicity, unspecified gastritis type  Mental retardation  Cerebral palsy, unspecified type  PEG tube malfunction      HPI/OVERNIGHT EVENTS: Patient non verbal. Labs consistent with profound hypocalcemia, hypomagnesemia, hypokalemia.  EKG stat and aggressive replacement ordered.    MEDICATIONS  (STANDING):  ALBUTerol/ipratropium for Nebulization 3 milliLiter(s) Nebulizer every 6 hours  ascorbic acid 500 milliGRAM(s) Oral daily  calcium carbonate Suspension 500 milliGRAM(s) Oral three times a day  clindamycin IVPB      clindamycin IVPB 600 milliGRAM(s) IV Intermittent every 8 hours  ferrous    sulfate Liquid 300 milliGRAM(s) Enteral Tube daily  lactated ringers. 1000 milliLiter(s) (125 mL/Hr) IV Continuous <Continuous>  lactobacillus acidophilus 1 Tablet(s) Oral daily  lamoTRIgine 100 milliGRAM(s) Oral daily  lamoTRIgine 50 milliGRAM(s) Oral at bedtime  magnesium sulfate  IVPB 2 Gram(s) IV Intermittent every 4 hours  pantoprazole Infusion 8 mG/Hr (10 mL/Hr) IV Continuous <Continuous>  PHENobarbital 32.4 milliGRAM(s) Oral two times a day  polyethylene glycol 3350 17 Gram(s) Oral daily  potassium acid phosphate/sodium acid phosphate tablet (K-PHOS No. 2) 1 Tablet(s) Oral once  potassium chloride   Powder 40 milliEquivalent(s) Oral every 4 hours  senna 2 Tablet(s) Oral at bedtime      Vital Signs Last 24 Hrs  T(C): 36.7 (26 May 2018 08:30), Max: 37.7 (26 May 2018 01:07)  T(F): 98.1 (26 May 2018 08:30), Max: 99.9 (26 May 2018 01:07)  HR: 105 (26 May 2018 09:01) (99 - 130)  BP: 124/72 (26 May 2018 08:30) (110/74 - 172/110)  BP(mean): --  RR: 19 (26 May 2018 08:30) (19 - 24)  SpO2: 99% (26 May 2018 09:01) (98% - 100%)    PHYSICAL EXAM:  Constitutional: mentally challenged  HEENT: PERRLA, EOMI, Normal Hearing, MMM  Neck: No LAD, No JVD  Back: Normal spine flexure, No CVA tenderness  Respiratory: CTAB Cardiovascular: S1 and S2, RRR, no M/G/R  Gastrointestinal: peg  Extremities: No peripheral edema  Vascular: 2+ peripheral pulses  Neurological: non verbal, does not follow commands.     CAPILLARY BLOOD GLUCOSE    LABS:                        8.2    9.2   )-----------( 248      ( 26 May 2018 09:18 )             26.0     05-26    141  |  112<H>  |  13.0  ----------------------------<  92  3.0<L>   |  18.0<L>  |  0.32<L>    Ca    6.2<LL>      26 May 2018 09:18  Phos  1.4     05-26  Mg     1.3     05-26    TPro  4.5<L>  /  Alb  2.6<L>  /  TBili  0.2<L>  /  DBili  x   /  AST  10  /  ALT  8   /  AlkPhos  60  05-26    PT/INR - ( 26 May 2018 02:15 )   PT: 12.9 sec;   INR: 1.17 ratio         PTT - ( 26 May 2018 02:15 )  PTT:28.6 sec      RADIOLOGY & ADDITIONAL TESTS:

## 2018-05-26 NOTE — PROGRESS NOTE ADULT - ASSESSMENT
62 year old male mentally challenged male resides in group home on peg feeds with GI bleed, aspiration pneumonia, profound electrolyte deficiencies including hypocalcemia, hypokalemia, hypomagnesemia, and hypophosphatemia. Tube feeds currently held. 62 year old male mentally challenged male resides in group home on peg feeds with GI bleed, aspiration pneumonia, profound electrolyte deficiencies including hypocalcemia, hypokalemia, hypomagnesemia, and hypophosphatemia. Tube feeds currently held.  Functional quadriplegia- supportive care  Severe protein calorie malnutrition- consider resume tube feeds in am.

## 2018-05-27 LAB
24R-OH-CALCIDIOL SERPL-MCNC: 26.7 NG/ML — LOW (ref 30–80)
ANION GAP SERPL CALC-SCNC: 10 MMOL/L — SIGNIFICANT CHANGE UP (ref 5–17)
ANISOCYTOSIS BLD QL: SLIGHT — SIGNIFICANT CHANGE UP
BUN SERPL-MCNC: 6 MG/DL — LOW (ref 8–20)
CALCIUM SERPL-MCNC: 7.6 MG/DL — LOW (ref 8.4–10.5)
CALCIUM SERPL-MCNC: 8.1 MG/DL — LOW (ref 8.6–10.2)
CHLORIDE SERPL-SCNC: 105 MMOL/L — SIGNIFICANT CHANGE UP (ref 98–107)
CO2 SERPL-SCNC: 20 MMOL/L — LOW (ref 22–29)
CREAT SERPL-MCNC: 0.41 MG/DL — LOW (ref 0.5–1.3)
ELLIPTOCYTES BLD QL SMEAR: SLIGHT — SIGNIFICANT CHANGE UP
EOSINOPHIL # BLD AUTO: 0.7 K/UL — HIGH (ref 0–0.5)
EOSINOPHIL NFR BLD AUTO: 12.4 % — HIGH (ref 0–5)
GLUCOSE SERPL-MCNC: 99 MG/DL — SIGNIFICANT CHANGE UP (ref 70–115)
HCT VFR BLD CALC: 25 % — LOW (ref 42–52)
HGB BLD-MCNC: 7.8 G/DL — LOW (ref 14–18)
HYPOCHROMIA BLD QL: SLIGHT — SIGNIFICANT CHANGE UP
LYMPHOCYTES # BLD AUTO: 1.1 K/UL — SIGNIFICANT CHANGE UP (ref 1–4.8)
LYMPHOCYTES # BLD AUTO: 19.7 % — LOW (ref 20–55)
MACROCYTES BLD QL: SLIGHT — SIGNIFICANT CHANGE UP
MAGNESIUM SERPL-MCNC: 2 MG/DL — SIGNIFICANT CHANGE UP (ref 1.6–2.6)
MCHC RBC-ENTMCNC: 27.8 PG — SIGNIFICANT CHANGE UP (ref 27–31)
MCHC RBC-ENTMCNC: 31.2 G/DL — LOW (ref 32–36)
MCV RBC AUTO: 89 FL — SIGNIFICANT CHANGE UP (ref 80–94)
MICROCYTES BLD QL: SLIGHT — SIGNIFICANT CHANGE UP
MONOCYTES # BLD AUTO: 0.7 K/UL — SIGNIFICANT CHANGE UP (ref 0–0.8)
MONOCYTES NFR BLD AUTO: 13.1 % — HIGH (ref 3–10)
NEUTROPHILS # BLD AUTO: 3 K/UL — SIGNIFICANT CHANGE UP (ref 1.8–8)
NEUTROPHILS NFR BLD AUTO: 54.6 % — SIGNIFICANT CHANGE UP (ref 37–73)
PLAT MORPH BLD: NORMAL — SIGNIFICANT CHANGE UP
PLATELET # BLD AUTO: 229 K/UL — SIGNIFICANT CHANGE UP (ref 150–400)
POIKILOCYTOSIS BLD QL AUTO: SLIGHT — SIGNIFICANT CHANGE UP
POTASSIUM SERPL-MCNC: 4.5 MMOL/L — SIGNIFICANT CHANGE UP (ref 3.5–5.3)
POTASSIUM SERPL-SCNC: 4.5 MMOL/L — SIGNIFICANT CHANGE UP (ref 3.5–5.3)
PTH-INTACT FLD-MCNC: 142 PG/ML — HIGH (ref 15–65)
RBC # BLD: 2.81 M/UL — LOW (ref 4.6–6.2)
RBC # FLD: 14.8 % — SIGNIFICANT CHANGE UP (ref 11–15.6)
RBC BLD AUTO: ABNORMAL
SODIUM SERPL-SCNC: 135 MMOL/L — SIGNIFICANT CHANGE UP (ref 135–145)
WBC # BLD: 5.6 K/UL — SIGNIFICANT CHANGE UP (ref 4.8–10.8)
WBC # FLD AUTO: 5.6 K/UL — SIGNIFICANT CHANGE UP (ref 4.8–10.8)

## 2018-05-27 PROCEDURE — 99222 1ST HOSP IP/OBS MODERATE 55: CPT

## 2018-05-27 PROCEDURE — 99233 SBSQ HOSP IP/OBS HIGH 50: CPT

## 2018-05-27 RX ORDER — PANTOPRAZOLE SODIUM 20 MG/1
40 TABLET, DELAYED RELEASE ORAL EVERY 12 HOURS
Qty: 0 | Refills: 0 | Status: DISCONTINUED | OUTPATIENT
Start: 2018-05-27 | End: 2018-05-30

## 2018-05-27 RX ADMIN — Medication 100 MILLIGRAM(S): at 05:59

## 2018-05-27 RX ADMIN — Medication 3 MILLILITER(S): at 20:54

## 2018-05-27 RX ADMIN — Medication 1250 MILLIGRAM(S): at 22:48

## 2018-05-27 RX ADMIN — Medication 500 MILLIGRAM(S): at 12:40

## 2018-05-27 RX ADMIN — Medication 32.4 MILLIGRAM(S): at 17:48

## 2018-05-27 RX ADMIN — Medication 3 MILLILITER(S): at 03:46

## 2018-05-27 RX ADMIN — Medication 1250 MILLIGRAM(S): at 05:59

## 2018-05-27 RX ADMIN — Medication 3 MILLILITER(S): at 16:24

## 2018-05-27 RX ADMIN — SODIUM CHLORIDE 125 MILLILITER(S): 9 INJECTION, SOLUTION INTRAVENOUS at 23:05

## 2018-05-27 RX ADMIN — Medication 1000 UNIT(S): at 15:43

## 2018-05-27 RX ADMIN — LAMOTRIGINE 100 MILLIGRAM(S): 25 TABLET, ORALLY DISINTEGRATING ORAL at 12:39

## 2018-05-27 RX ADMIN — POLYETHYLENE GLYCOL 3350 17 GRAM(S): 17 POWDER, FOR SOLUTION ORAL at 12:40

## 2018-05-27 RX ADMIN — Medication 100 MILLIGRAM(S): at 22:50

## 2018-05-27 RX ADMIN — Medication 32.4 MILLIGRAM(S): at 05:59

## 2018-05-27 RX ADMIN — LAMOTRIGINE 50 MILLIGRAM(S): 25 TABLET, ORALLY DISINTEGRATING ORAL at 22:48

## 2018-05-27 RX ADMIN — SODIUM CHLORIDE 125 MILLILITER(S): 9 INJECTION, SOLUTION INTRAVENOUS at 12:41

## 2018-05-27 RX ADMIN — Medication 3 MILLILITER(S): at 08:34

## 2018-05-27 RX ADMIN — Medication 100 MILLIGRAM(S): at 15:45

## 2018-05-27 RX ADMIN — PANTOPRAZOLE SODIUM 40 MILLIGRAM(S): 20 TABLET, DELAYED RELEASE ORAL at 17:40

## 2018-05-27 RX ADMIN — SODIUM CHLORIDE 125 MILLILITER(S): 9 INJECTION, SOLUTION INTRAVENOUS at 01:33

## 2018-05-27 RX ADMIN — Medication 1 TABLET(S): at 12:39

## 2018-05-27 RX ADMIN — Medication 1 TABLET(S): at 12:40

## 2018-05-27 RX ADMIN — Medication 1250 MILLIGRAM(S): at 15:42

## 2018-05-27 RX ADMIN — SENNA PLUS 2 TABLET(S): 8.6 TABLET ORAL at 22:47

## 2018-05-27 RX ADMIN — Medication 300 MILLIGRAM(S): at 12:39

## 2018-05-27 NOTE — CONSULT NOTE ADULT - SUBJECTIVE AND OBJECTIVE BOX
[facility] [place] HARINI BOYD MD FACP   KANA ALVARADO MD  Diplomates American Board of Internal Medicine and Infecctious Diseases  [phone]  [phone] JAMILAH [id number]      HPI:  Pt is a 61 y/o Male with pmh/o non-verbal a  at baseline, CP, MR, GERD, with two recent admissions for gastritis/PNA at [facility], who lives at group home and is accompanied by aide, who presents to ED as a transfer from [facility] after pt experienced dark colored, concern for bloody coffee ground emesis which was also note from PEG tube.   . At [facility] CT A/P performed and showed no acute intra-abdominal pathology however did show bibasilar consolidations. Aide at bedside notes concern for aspiration of emesis during episode which preceded ED visit. Pt arrived tachycardic in 120's with fever both which resolved after IVF and abx.  ASKED TO EVALUATE FROM ID STANDPOINT       PAST MEDICAL & SURGICAL HISTORY:  Constipation, unspecified constipation type  Dysphagia, unspecified type  Pneumonia due to infectious organism, unspecified laterality, unspecified part of lung  Gastritis, presence of bleeding unspecified, unspecified chronicity, unspecified gastritis type  Mental retardation  Cerebral palsy, unspecified type  PEG tube malfunction      ANTIBIOTICS  clindamycin IVPB      clindamycin IVPB 600 milliGRAM(s) IV Intermittent every 8 hours      Allergies    Fish Products (Unknown)  No Known Drug Allergies    Intolerances        SOCIAL HISTORY:       FAMILY HX   FAMILY HISTORY:  No pertinent family history in first degree relatives      Vital Signs Last 24 Hrs  T(C): 37.1 (27 May 2018 04:09), Max: 37.5 (26 May 2018 15:53)  T(F): 98.8 (27 May 2018 04:09), Max: 99.5 (26 May 2018 15:53)  HR: 89 (27 May 2018 08:34) (81 - 106)  BP: 116/82 (27 May 2018 04:09) (116/59 - 146/75)  BP(mean): --  RR: 20 (27 May 2018 04:09) (17 - 22)  SpO2: 97% (26 May 2018 20:55) (96% - 100%)  Drug Dosing Weight        REVIEW OF SYSTEMS:    CONSTITUTIONAL:  As per HPI.    HEENT:  Eyes:  No diplopia or blurred vision. ENT:  No earache, sore throat or runny nose.    CARDIOVASCULAR:  No pressure, squeezing, strangling, tightness, heaviness or aching about the chest, neck, axilla or epigastrium.    RESPIRATORY:  No cough, shortness of breath, PND or orthopnea.    GASTROINTESTINAL:  No nausea, vomiting or diarrhea.    GENITOURINARY:  No dysuria, frequency or urgency.    MUSCULOSKELETAL:  As per HPI.    SKIN:  No change in skin, hair or nails.    NEUROLOGIC:  No paresthesias, fasciculations, seizures or weakness.                  PHYSICAL EXAMINATION:    GENERAL: The patient is a well-developed, NON VERBAL IN NAD    VITAL SIGNS: T(C): 37.1 (05-27-18 @ 04:09), Max: 37.5 (05-26-18 @ 15:53)  HR: 89 (05-27-18 @ 08:34) (81 - 106)  BP: 116/82 (05-27-18 @ 04:09) (116/59 - 146/75)  RR: 20 (05-27-18 @ 04:09) (17 - 22)  SpO2: 97% (05-26-18 @ 20:55) (96% - 100%)  Wt(kg): --    HEENT: Head is normocephalic and atraumatic.  ANICTERIC  NECK: Supple. No carotid bruits.  No lymphadenopathy or thyromegaly.    LUNGS :COARSE BREATH SOUNDS    HEART: Regular rate and rhythm without murmur.    ABDOMEN: Soft, nontender, and nondistended.  Positive bowel sounds.  No hepatosplenomegaly was noted. NO REBOUND NO GUARDING    EXTREMITIES: NO EDEMA NO ERYTHEMA CONTRACTED     NEUROLOGIC: NON VERBAL       SKIN: No ulceration or induration present. NO RASH        BLOOD CULTURES       URINE CX          LABS:                        7.8    5.6   )-----------( x        ( 27 May 2018 07:23 )             25.0     05-27    135  |  105  |  6.0<L>  ----------------------------<  99  4.5   |  20.0<L>  |  0.41<L>    Ca    8.1<L>      27 May 2018 07:23  Phos  1.4     05-26  Mg     2.0     05-27    TPro  4.5<L>  /  Alb  2.6<L>  /  TBili  0.2<L>  /  DBili  x   /  AST  10  /  ALT  8   /  AlkPhos  60  05-26    PT/INR - ( 26 May 2018 09:18 )   PT: 14.2 sec;   INR: 1.28 ratio         PTT - ( 26 May 2018 09:18 )  PTT:23.3 sec      RADIOLOGY & ADDITIONAL STUDIES:      ASSESSMENT/PLAN    t is a 61 y/o Male with pmh/o non-verbal a  at baseline, CP, MR, GERD, with two recent admissions for gastritis/PNA at [facility], who lives at group home and is accompanied by aide, who presents to ED as a transfer from [facility] after pt experienced dark colored, concern for bloody coffee ground emesis which was also note from PEG tube.   . At [facility] CT A/P performed and showed no acute intra-abdominal pathology however did show bibasilar consolidations. Aide at bedside notes concern for aspiration of emesis during episode which preceded ED visit. Pt arrived tachycardic in 120's with fever both which resolved after IVF and abx.  PT STARTED ON IV ABX BLOOD CX PENDING  WILL CHANGE ABX TO INVANZ AS PT WITH MULTIPLE HOSP VISITS WILL FOLLOW UP  CONSIDER  DEDICATED CHEST CAT SCAN IF SPIKES TO EVALUATE FOR PNEUMONIA  WILL CHECK PROCALCITON                KANA REED MD [facility] [place] HARINI BOYD MD FACP   KANA ALVARADO MD  Diplomates American Board of Internal Medicine and Infecctious Diseases  [phone]  [phone] JAMILAH [id number]      HPI:  Pt is a 63 y/o Male with pmh/o non-verbal a  at baseline, CP, MR, GERD, with two recent admissions for gastritis/PNA at [facility], who lives at group home and is accompanied by aide, who presents to ED as a transfer from [facility] after pt experienced dark colored, concern for bloody coffee ground emesis which was also note from PEG tube.   . At [facility] CT A/P performed and showed no acute intra-abdominal pathology however did show bibasilar consolidations. Aide at bedside notes concern for aspiration of emesis during episode which preceded ED visit. Pt arrived tachycardic in 120's with fever both which resolved after IVF and abx.  ASKED TO EVALUATE FROM ID STANDPOINT       PAST MEDICAL & SURGICAL HISTORY:  Constipation, unspecified constipation type  Dysphagia, unspecified type  Pneumonia due to infectious organism, unspecified laterality, unspecified part of lung  Gastritis, presence of bleeding unspecified, unspecified chronicity, unspecified gastritis type  Mental retardation  Cerebral palsy, unspecified type  PEG tube malfunction      ANTIBIOTICS  clindamycin IVPB      clindamycin IVPB 600 milliGRAM(s) IV Intermittent every 8 hours      Allergies    Fish Products (Unknown)  No Known Drug Allergies    Intolerances        SOCIAL HISTORY:       FAMILY HX   FAMILY HISTORY:  No pertinent family history in first degree relatives      Vital Signs Last 24 Hrs  T(C): 37.1 (27 May 2018 04:09), Max: 37.5 (26 May 2018 15:53)  T(F): 98.8 (27 May 2018 04:09), Max: 99.5 (26 May 2018 15:53)  HR: 89 (27 May 2018 08:34) (81 - 106)  BP: 116/82 (27 May 2018 04:09) (116/59 - 146/75)  BP(mean): --  RR: 20 (27 May 2018 04:09) (17 - 22)  SpO2: 97% (26 May 2018 20:55) (96% - 100%)  Drug Dosing Weight        REVIEW OF SYSTEMS:    CONSTITUTIONAL:  As per HPI.    HEENT:  Eyes:  No diplopia or blurred vision. ENT:  No earache, sore throat or runny nose.    CARDIOVASCULAR:  No pressure, squeezing, strangling, tightness, heaviness or aching about the chest, neck, axilla or epigastrium.    RESPIRATORY:  No cough, shortness of breath, PND or orthopnea.    GASTROINTESTINAL:  No nausea, vomiting or diarrhea.    GENITOURINARY:  No dysuria, frequency or urgency.    MUSCULOSKELETAL:  As per HPI.    SKIN:  No change in skin, hair or nails.    NEUROLOGIC:  No paresthesias, fasciculations, seizures or weakness.                  PHYSICAL EXAMINATION:    GENERAL: The patient is a well-developed, NON VERBAL IN NAD    VITAL SIGNS: T(C): 37.1 (05-27-18 @ 04:09), Max: 37.5 (05-26-18 @ 15:53)  HR: 89 (05-27-18 @ 08:34) (81 - 106)  BP: 116/82 (05-27-18 @ 04:09) (116/59 - 146/75)  RR: 20 (05-27-18 @ 04:09) (17 - 22)  SpO2: 97% (05-26-18 @ 20:55) (96% - 100%)  Wt(kg): --    HEENT: Head is normocephalic and atraumatic.  ANICTERIC  NECK: Supple. No carotid bruits.  No lymphadenopathy or thyromegaly.    LUNGS :COARSE BREATH SOUNDS    HEART: Regular rate and rhythm without murmur.    ABDOMEN: Soft, nontender, and nondistended.  Positive bowel sounds.  No hepatosplenomegaly was noted. NO REBOUND NO GUARDING    EXTREMITIES: NO EDEMA NO ERYTHEMA CONTRACTED     NEUROLOGIC: NON VERBAL       SKIN: No ulceration or induration present. NO RASH        BLOOD CULTURES       URINE CX          LABS:                        7.8    5.6   )-----------( x        ( 27 May 2018 07:23 )             25.0     05-27    135  |  105  |  6.0<L>  ----------------------------<  99  4.5   |  20.0<L>  |  0.41<L>    Ca    8.1<L>      27 May 2018 07:23  Phos  1.4     05-26  Mg     2.0     05-27    TPro  4.5<L>  /  Alb  2.6<L>  /  TBili  0.2<L>  /  DBili  x   /  AST  10  /  ALT  8   /  AlkPhos  60  05-26    PT/INR - ( 26 May 2018 09:18 )   PT: 14.2 sec;   INR: 1.28 ratio         PTT - ( 26 May 2018 09:18 )  PTT:23.3 sec      RADIOLOGY & ADDITIONAL STUDIES:      ASSESSMENT/PLAN    t is a 63 y/o Male with pmh/o non-verbal a  at baseline, CP, MR, GERD, with two recent admissions for gastritis/PNA at [facility], who lives at group home and is accompanied by aide, who presents to ED as a transfer from [facility] after pt experienced dark colored, concern for bloody coffee ground emesis which was also note from PEG tube.   . At [facility] CT A/P performed and showed no acute intra-abdominal pathology however did show bibasilar consolidations. Aide at bedside notes concern for aspiration of emesis during episode which preceded ED visit. Pt arrived tachycardic in 120's with fever both which resolved after IVF and abx.  PT STARTED ON IV ABX BLOOD CX PENDING  WILL CHANGE ABX TO INVANZ AS PT WITH MULTIPLE HOSP VISITS WILL FOLLOW UP  CONSIDER  DEDICATED CHEST CAT SCAN IF SPIKES TO EVALUATE FOR PNEUMONIA    PROCALCITON NEG  goes against active infection if cx are all neg may consider d/c abx                KANA REED MD

## 2018-05-27 NOTE — PROGRESS NOTE ADULT - SUBJECTIVE AND OBJECTIVE BOX
MARINA LEAVITT Patient is a 62y old  Male who presents with a chief complaint of sent from [facility] for GI bleed from PEG (26 May 2018 03:41)     HPI:  Pt is a 63 y/o Male with pmh/o non-verbal and handicap at baseline, CP, MR, GERD, with two recent admissions for gastritis/PNA at [facility], who lives at group home and is accompanied by aide, who presents to ED as a transfer from [facility] after pt experienced dark colored, concern for bloody coffee ground emesis which was also note from PEG tube. History taken largely from ED physician who reports one additional episode in ED. As per aide at bedside, pt never deviated from baseline mentation or behaviors. In ED prior to transfer pt received IV levaquin and zosyn and IVF, lactate originally 4.5, now to 3.5 and less than 2 in [facility] ED after additional IVF. At [facility] CT A/P performed and showed no acute intra-abdominal pathology however did show bibasilar consolidations. Aide at bedside notes concern for aspiration of emesis during episode which preceded ED visit. Pt arrived tachycardic in 120's with fever both which resolved after IVF and abx. (26 May 2018 03:41)    The patient was seen and evaluated   The patient is in no acute distress.      I&O's Summary    26 May 2018 07:01  -  27 May 2018 07:00  --------------------------------------------------------  IN: 0 mL / OUT: 2450 mL / NET: -2450 mL      Allergies    Fish Products (Unknown)  No Known Drug Allergies    Intolerances      HEALTH ISSUES - PROBLEM Dx:  Mental retardation: Mental retardation  Hypophosphatemia: Hypophosphatemia  Hypocalcemia: Hypocalcemia  Hypokalemia: Hypokalemia  Aspiration pneumonia of both lungs due to vomit, unspecified part of lung: Aspiration pneumonia of both lungs due to vomit, unspecified part of lung  Constipation, unspecified constipation type: Constipation, unspecified constipation type  ELVIA (acute kidney injury): ELVIA (acute kidney injury)  Upper GI bleed: Upper GI bleed  Aspiration into airway, initial encounter: Aspiration into airway, initial encounter  Sepsis, due to unspecified organism: Sepsis, due to unspecified organism        PAST MEDICAL & SURGICAL HISTORY:  Constipation, unspecified constipation type  Dysphagia, unspecified type  Pneumonia due to infectious organism, unspecified laterality, unspecified part of lung  Gastritis, presence of bleeding unspecified, unspecified chronicity, unspecified gastritis type  Mental retardation  Cerebral palsy, unspecified type  PEG tube malfunction          Vital Signs Last 24 Hrs  T(C): 37.1 (27 May 2018 04:09), Max: 37.5 (26 May 2018 15:53)  T(F): 98.8 (27 May 2018 04:09), Max: 99.5 (26 May 2018 15:53)  HR: 89 (27 May 2018 08:34) (81 - 106)  BP: 116/82 (27 May 2018 04:09) (116/59 - 146/75)  BP(mean): --  RR: 20 (27 May 2018 04:09) (17 - 22)  SpO2: 97% (26 May 2018 20:55) (96% - 100%)T(C): 37.1 (05-27-18 @ 04:09), Max: 37.5 (05-26-18 @ 15:53)  HR: 89 (05-27-18 @ 08:34) (81 - 106)  BP: 116/82 (05-27-18 @ 04:09) (116/59 - 146/75)  RR: 20 (05-27-18 @ 04:09) (17 - 22)  SpO2: 97% (05-26-18 @ 20:55) (96% - 100%)  Wt(kg): --    PHYSICAL EXAM:    GENERAL: NAD,  HEAD:  Atraumatic, Normocephalic  EYES: EOMI, PERRL  NERVOUS SYSTEM: not Moving  upper and lower extremities;  CHEST/LUNG: Clear to auscultation bilaterally; No rales, rhonchi, wheezing, PEG   HEART: Regular rate and rhythm; No murmurs,   ABDOMEN: Soft, Nontender, Nondistended; Bowel sounds present  EXTREMITIES:  Peripheral Pulses, No  cyanosis, or edema  psychiatry- cant assess Insight and judgement intact     acetaminophen   Tablet 650 milliGRAM(s) Oral every 6 hours PRN  ALBUTerol/ipratropium for Nebulization 3 milliLiter(s) Nebulizer every 6 hours  ascorbic acid 500 milliGRAM(s) Oral daily  calcium carbonate Suspension 1250 milliGRAM(s) Oral three times a day  cholecalciferol 1000 Unit(s) Oral daily  clindamycin IVPB      clindamycin IVPB 600 milliGRAM(s) IV Intermittent every 8 hours  ferrous    sulfate Liquid 300 milliGRAM(s) Enteral Tube daily  lactated ringers. 1000 milliLiter(s) IV Continuous <Continuous>  lactobacillus acidophilus 1 Tablet(s) Oral daily  lamoTRIgine 100 milliGRAM(s) Oral daily  lamoTRIgine 50 milliGRAM(s) Oral at bedtime  multivitamin 1 Tablet(s) Oral daily  ondansetron Injectable 4 milliGRAM(s) IV Push every 6 hours PRN  pantoprazole  Injectable 40 milliGRAM(s) IV Push every 12 hours  PHENobarbital 32.4 milliGRAM(s) Oral two times a day  polyethylene glycol 3350 17 Gram(s) Oral daily  senna 2 Tablet(s) Oral at bedtime      LABS:                          7.8    5.6   )-----------( x        ( 27 May 2018 07:23 )             25.0     05-27    135  |  105  |  6.0<L>  ----------------------------<  99  4.5   |  20.0<L>  |  0.41<L>    Ca    8.1<L>      27 May 2018 07:23  Phos  1.4     05-26  Mg     2.0     05-27    TPro  4.5<L>  /  Alb  2.6<L>  /  TBili  0.2<L>  /  DBili  x   /  AST  10  /  ALT  8   /  AlkPhos  60  05-26    LIVER FUNCTIONS - ( 26 May 2018 09:18 )  Alb: 2.6 g/dL / Pro: 4.5 g/dL / ALK PHOS: 60 U/L / ALT: 8 U/L / AST: 10 U/L / GGT: x           PT/INR - ( 26 May 2018 09:18 )   PT: 14.2 sec;   INR: 1.28 ratio         PTT - ( 26 May 2018 09:18 )  PTT:23.3 sec        CAPILLARY BLOOD GLUCOSE          RADIOLOGY & ADDITIONAL TESTS:      Consultant notes reviewed    Case discussed with consultant/provider/ family /patient

## 2018-05-27 NOTE — PROGRESS NOTE ADULT - SUBJECTIVE AND OBJECTIVE BOX
Pt seen and examined. Unable to get hx from pt. No further GI bleeding here.     MEDICATIONS:  MEDICATIONS  (STANDING):  ALBUTerol/ipratropium for Nebulization 3 milliLiter(s) Nebulizer every 6 hours  ascorbic acid 500 milliGRAM(s) Oral daily  calcium carbonate Suspension 1250 milliGRAM(s) Oral three times a day  cholecalciferol 1000 Unit(s) Oral daily  clindamycin IVPB      clindamycin IVPB 600 milliGRAM(s) IV Intermittent every 8 hours  ferrous    sulfate Liquid 300 milliGRAM(s) Enteral Tube daily  lactated ringers. 1000 milliLiter(s) (125 mL/Hr) IV Continuous <Continuous>  lactobacillus acidophilus 1 Tablet(s) Oral daily  lamoTRIgine 100 milliGRAM(s) Oral daily  lamoTRIgine 50 milliGRAM(s) Oral at bedtime  multivitamin 1 Tablet(s) Oral daily  pantoprazole Infusion 8 mG/Hr (10 mL/Hr) IV Continuous <Continuous>  PHENobarbital 32.4 milliGRAM(s) Oral two times a day  polyethylene glycol 3350 17 Gram(s) Oral daily  senna 2 Tablet(s) Oral at bedtime    MEDICATIONS  (PRN):  acetaminophen   Tablet 650 milliGRAM(s) Oral every 6 hours PRN For Temp greater than 38 C (100.4 F)  ondansetron Injectable 4 milliGRAM(s) IV Push every 6 hours PRN Nausea      Allergies    Fish Products (Unknown)  No Known Drug Allergies    Intolerances        Vital Signs Last 24 Hrs  T(C): 37.1 (27 May 2018 04:09), Max: 37.5 (26 May 2018 15:53)  T(F): 98.8 (27 May 2018 04:09), Max: 99.5 (26 May 2018 15:53)  HR: 89 (27 May 2018 08:34) (81 - 106)  BP: 116/82 (27 May 2018 04:09) (114/65 - 146/75)  BP(mean): --  RR: 20 (27 May 2018 04:09) (17 - 22)  SpO2: 97% (26 May 2018 20:55) (96% - 100%)    05-26 @ 07:01  -  05-27 @ 07:00  --------------------------------------------------------  IN: 0 mL / OUT: 2450 mL / NET: -2450 mL        PHYSICAL EXAM:    General: Well developed; well nourished; in no acute distress  HEENT: MMM, conjunctiva pink and sclera anicteric.  Lungs: clear to auscultation  Cor: RRR S1, S2 only.  Gastrointestinal :Abdomen: Soft non-tender non-distended; Normal bowel sounds; + G tube No hepatosplenomegaly  Extremities: no cyanosis, clubbing or edema.  Skin: Warm and dry. No obvious rash      LABS:      CBC Full  -  ( 27 May 2018 07:23 )  WBC Count : 5.6 K/uL  Hemoglobin : 7.8 g/dL  Hematocrit : 25.0 %  Platelet Count - Automated : x  Mean Cell Volume : 89.0 fl  Mean Cell Hemoglobin : 27.8 pg  Mean Cell Hemoglobin Concentration : 31.2 g/dL  Auto Neutrophil # : 3.0 K/uL  Auto Lymphocyte # : 1.1 K/uL  Auto Monocyte # : 0.7 K/uL  Auto Eosinophil # : 0.7 K/uL  Auto Basophil # : x  Auto Neutrophil % : 54.6 %  Auto Lymphocyte % : 19.7 %  Auto Monocyte % : 13.1 %  Auto Eosinophil % : 12.4 %  Auto Basophil % : x    05-27    135  |  105  |  6.0<L>  ----------------------------<  99  4.5   |  20.0<L>  |  0.41<L>    Ca    8.1<L>      27 May 2018 07:23  Phos  1.4     05-26  Mg     2.0     05-27    TPro  4.5<L>  /  Alb  2.6<L>  /  TBili  0.2<L>  /  DBili  x   /  AST  10  /  ALT  8   /  AlkPhos  60  05-26    PT/INR - ( 26 May 2018 09:18 )   PT: 14.2 sec;   INR: 1.28 ratio         PTT - ( 26 May 2018 09:18 )  PTT:23.3 sec                  RADIOLOGY & ADDITIONAL STUDIES (The following images were personally reviewed):

## 2018-05-27 NOTE — PROGRESS NOTE ADULT - ASSESSMENT
62 year old male mentally challenged male resides in group home on peg feeds with GI bleed, aspiration pneumonia, profound electrolyte deficiencies including hypocalcemia, hypokalemia, hypomagnesemia, and hypophosphatemia. Tube feeds currently held.  Functional quadriplegia- supportive care  Severe protein calorie malnutrition- consider resume tube feeds in am.     Problem/Plan - 1:  ·  Problem: Upper GI bleed.  Plan: No further bleeding, on protonix drip,  consider converting drip to iv push and resuming feeds in am  Monitor hgb.      Problem/Plan - 2:  ·  Problem: Aspiration pneumonia of both lungs due to vomit, unspecified part of lung.  Planon dmission : Concern for gram negative and anaerobes, continue clinda- for now - will disuses with ID   no fever, no elevated wbc.      Problem/Plan - 3:  ·  Problem: Hypokalemia.  Plan: Replace and repeat level in am.      Problem/Plan - 4:  ·  Problem: Hypocalcemia.  Plan: Assess ionized calcium, ekg stat to evaluate for qt prolongation, pth and vitamin d levels,   replace calcium and repeat level in am.      Problem/Plan - 5:  ·  Problem: Hypophosphatemia.  Plan: replace and repeat.      Problem/Plan - 6:  Problem: Mental retardation. Plan: Supportive care.     Problem/Plan - 7:  ·  Problem: Constipation, unspecified constipation type.  Plan: Add miralax, and senna in peg.

## 2018-05-27 NOTE — PROGRESS NOTE ADULT - PROBLEM SELECTOR PLAN 1
Clinically resolved. No plans for EGD presently. OK to restart TF via G tube. Would start at slow rate of 30 cc./hr. advancing to 50 cc./hr therafter, Continue IV Pantoprazole therapy. Clinically resolved. No plans for EGD presently. OK to restart TF via G tube. Would start at slow rate of 30 cc./hr. advancing to 50 cc./hr thereafter, Continue IV Pantoprazole therapy.

## 2018-05-28 ENCOUNTER — TRANSCRIPTION ENCOUNTER (OUTPATIENT)
Age: 63
End: 2018-05-28

## 2018-05-28 LAB
ANION GAP SERPL CALC-SCNC: 10 MMOL/L — SIGNIFICANT CHANGE UP (ref 5–17)
APPEARANCE UR: CLEAR — SIGNIFICANT CHANGE UP
BACTERIA # UR AUTO: ABNORMAL
BILIRUB UR-MCNC: NEGATIVE — SIGNIFICANT CHANGE UP
BUN SERPL-MCNC: 6 MG/DL — LOW (ref 8–20)
CA-I BLD-SCNC: 1.13 MMOL/L — SIGNIFICANT CHANGE UP (ref 1.12–1.3)
CA-I BLD-SCNC: 1.14 MMOL/L — SIGNIFICANT CHANGE UP (ref 1.12–1.3)
CALCIUM SERPL-MCNC: 8 MG/DL — LOW (ref 8.6–10.2)
CHLORIDE SERPL-SCNC: 104 MMOL/L — SIGNIFICANT CHANGE UP (ref 98–107)
CO2 SERPL-SCNC: 25 MMOL/L — SIGNIFICANT CHANGE UP (ref 22–29)
COLOR SPEC: YELLOW — SIGNIFICANT CHANGE UP
CREAT SERPL-MCNC: 0.44 MG/DL — LOW (ref 0.5–1.3)
DIFF PNL FLD: NEGATIVE — SIGNIFICANT CHANGE UP
EPI CELLS # UR: SIGNIFICANT CHANGE UP
GLUCOSE SERPL-MCNC: 104 MG/DL — SIGNIFICANT CHANGE UP (ref 70–115)
GLUCOSE UR QL: NEGATIVE MG/DL — SIGNIFICANT CHANGE UP
HCT VFR BLD CALC: 23.4 % — LOW (ref 42–52)
HGB BLD-MCNC: 7.3 G/DL — LOW (ref 14–18)
KETONES UR-MCNC: NEGATIVE — SIGNIFICANT CHANGE UP
LEUKOCYTE ESTERASE UR-ACNC: NEGATIVE — SIGNIFICANT CHANGE UP
MAGNESIUM SERPL-MCNC: 1.7 MG/DL — SIGNIFICANT CHANGE UP (ref 1.6–2.6)
MCHC RBC-ENTMCNC: 28 PG — SIGNIFICANT CHANGE UP (ref 27–31)
MCHC RBC-ENTMCNC: 31.2 G/DL — LOW (ref 32–36)
MCV RBC AUTO: 89.7 FL — SIGNIFICANT CHANGE UP (ref 80–94)
NITRITE UR-MCNC: NEGATIVE — SIGNIFICANT CHANGE UP
PH UR: 8 — SIGNIFICANT CHANGE UP (ref 5–8)
PHOSPHATE SERPL-MCNC: 3.5 MG/DL — SIGNIFICANT CHANGE UP (ref 2.4–4.7)
PLATELET # BLD AUTO: 216 K/UL — SIGNIFICANT CHANGE UP (ref 150–400)
POTASSIUM SERPL-MCNC: 3.6 MMOL/L — SIGNIFICANT CHANGE UP (ref 3.5–5.3)
POTASSIUM SERPL-SCNC: 3.6 MMOL/L — SIGNIFICANT CHANGE UP (ref 3.5–5.3)
PROT UR-MCNC: 15 MG/DL
RBC # BLD: 2.61 M/UL — LOW (ref 4.6–6.2)
RBC # FLD: 14.4 % — SIGNIFICANT CHANGE UP (ref 11–15.6)
RBC CASTS # UR COMP ASSIST: ABNORMAL /HPF (ref 0–4)
SODIUM SERPL-SCNC: 139 MMOL/L — SIGNIFICANT CHANGE UP (ref 135–145)
SP GR SPEC: 1.01 — SIGNIFICANT CHANGE UP (ref 1.01–1.02)
UROBILINOGEN FLD QL: NEGATIVE MG/DL — SIGNIFICANT CHANGE UP
WBC # BLD: 5.8 K/UL — SIGNIFICANT CHANGE UP (ref 4.8–10.8)
WBC # FLD AUTO: 5.8 K/UL — SIGNIFICANT CHANGE UP (ref 4.8–10.8)
WBC UR QL: SIGNIFICANT CHANGE UP

## 2018-05-28 PROCEDURE — 99233 SBSQ HOSP IP/OBS HIGH 50: CPT

## 2018-05-28 PROCEDURE — 99232 SBSQ HOSP IP/OBS MODERATE 35: CPT

## 2018-05-28 PROCEDURE — 93010 ELECTROCARDIOGRAM REPORT: CPT

## 2018-05-28 RX ADMIN — Medication 3 MILLILITER(S): at 14:28

## 2018-05-28 RX ADMIN — Medication 1 TABLET(S): at 11:38

## 2018-05-28 RX ADMIN — Medication 100 MILLIGRAM(S): at 06:24

## 2018-05-28 RX ADMIN — Medication 32.4 MILLIGRAM(S): at 18:27

## 2018-05-28 RX ADMIN — PANTOPRAZOLE SODIUM 40 MILLIGRAM(S): 20 TABLET, DELAYED RELEASE ORAL at 18:27

## 2018-05-28 RX ADMIN — Medication 3 MILLILITER(S): at 08:52

## 2018-05-28 RX ADMIN — POLYETHYLENE GLYCOL 3350 17 GRAM(S): 17 POWDER, FOR SOLUTION ORAL at 11:38

## 2018-05-28 RX ADMIN — Medication 1250 MILLIGRAM(S): at 13:25

## 2018-05-28 RX ADMIN — Medication 300 MILLIGRAM(S): at 11:38

## 2018-05-28 RX ADMIN — SODIUM CHLORIDE 125 MILLILITER(S): 9 INJECTION, SOLUTION INTRAVENOUS at 15:32

## 2018-05-28 RX ADMIN — LAMOTRIGINE 100 MILLIGRAM(S): 25 TABLET, ORALLY DISINTEGRATING ORAL at 11:38

## 2018-05-28 RX ADMIN — Medication 1250 MILLIGRAM(S): at 22:23

## 2018-05-28 RX ADMIN — Medication 32.4 MILLIGRAM(S): at 06:24

## 2018-05-28 RX ADMIN — Medication 1000 UNIT(S): at 11:38

## 2018-05-28 RX ADMIN — Medication 500 MILLIGRAM(S): at 11:38

## 2018-05-28 RX ADMIN — Medication 3 MILLILITER(S): at 20:04

## 2018-05-28 RX ADMIN — Medication 3 MILLILITER(S): at 03:47

## 2018-05-28 RX ADMIN — LAMOTRIGINE 50 MILLIGRAM(S): 25 TABLET, ORALLY DISINTEGRATING ORAL at 22:23

## 2018-05-28 RX ADMIN — SENNA PLUS 2 TABLET(S): 8.6 TABLET ORAL at 22:23

## 2018-05-28 RX ADMIN — SODIUM CHLORIDE 125 MILLILITER(S): 9 INJECTION, SOLUTION INTRAVENOUS at 07:18

## 2018-05-28 RX ADMIN — SODIUM CHLORIDE 125 MILLILITER(S): 9 INJECTION, SOLUTION INTRAVENOUS at 23:34

## 2018-05-28 RX ADMIN — PANTOPRAZOLE SODIUM 40 MILLIGRAM(S): 20 TABLET, DELAYED RELEASE ORAL at 06:23

## 2018-05-28 RX ADMIN — Medication 1250 MILLIGRAM(S): at 06:24

## 2018-05-28 NOTE — PROGRESS NOTE ADULT - PROBLEM SELECTOR PLAN 2
Rx as Medicine. G-tube placement contrary to popular misconception does not prevent aspiration of gastric contents and is placed for pt. comfort and to facilitate nursing care in pts. such as this unable to tolerate or handle PO medication administration and feeds.
GI following
Concern for gram negative and anaerobes, continue clinda  no fever, no elevated wbc
Management of PN as per medicine. G tube does not prevent aspiration from occurring. No plans or need for EGD presently which he would be optimized for even if it became necessary in the immediate future.

## 2018-05-28 NOTE — PROGRESS NOTE ADULT - PROBLEM SELECTOR PLAN 1
No recurrent emesis or GI bleeding with restarting TF. No plans or need for EGD or continued GI f/u. Signing off. Reconsult as need. Thank you. Pt. likely had stress induced gastritis and should be discharged back to facility when medically ready on Pantoprazole 40 mg./d. for the next one to two months. Thank you.

## 2018-05-28 NOTE — PROGRESS NOTE ADULT - PROBLEM SELECTOR PROBLEM 2
Aspiration pneumonia of both lungs due to vomit, unspecified part of lung
Aspiration pneumonia of both lungs due to vomit, unspecified part of lung
Upper GI bleed
Aspiration pneumonia of both lungs due to vomit, unspecified part of lung

## 2018-05-28 NOTE — DISCHARGE NOTE ADULT - SOCIAL WORKER'S NAME
SW communicated with residence regarding "Attending Certification" - this portion of the d/c is standard and is not an individualized recommendation.

## 2018-05-28 NOTE — PROGRESS NOTE ADULT - ASSESSMENT
62 year old male mentally challenged male resides in group home on peg feeds with GI bleed, aspiration pneumonia, profound electrolyte deficiencies including hypocalcemia, hypokalemia, hypomagnesemia, and hypophosphatemia. Tube feeds currently held.  Functional quadriplegia- supportive care  Severe protein calorie malnutrition- consider resume tube feeds in am.     Problem/Plan - 1:  ·  Problem: Upper GI bleed.  Plan: No further bleeding, on protonix drip,  consider converting drip to iv push and resuming feeds in am  Monitor hgb.      Problem/Plan - 2:  ·  Problem: Aspiration pneumonia of both lungs due to vomit, unspecified part of lung. admission : Concern for gram negative and anaerobes,Disccontinue clinda- disusesed with ID   no fever, no elevated wbc low procalcitonin     Problem/Plan - 3:  ·  Problem: Hypokalemia.  Plan: Replace and repeat level in am.      Problem/Plan - 4:  ·  Problem: Hypocalcemia.  Plan: Assess ionized calcium, ekg stat to evaluate for qt prolongation, pth and vitamin d levels,   replace calcium and repeat level in am.      Problem/Plan - 5:  ·  Problem: Hypophosphatemia.  Plan: replace and repeat.      Problem/Plan - 6:  Problem: Mental retardation. Plan: Supportive care.     Problem/Plan - 7:  ·  Problem: Constipation, unspecified constipation type.  Plan: Add Miralax and senna in peg.

## 2018-05-28 NOTE — DISCHARGE NOTE ADULT - HOSPITAL COURSE
62 year old male mentally challenged male resides in group home on peg feeds with GI bleed, aspiration pneumonia, profound electrolyte deficiencies including hypocalcemia, hypokalemia, hypomagnesemia, and hypophosphatemia. Tube feeds currently held.  Functional quadriplegia- supportive care  Severe protein calorie malnutrition- consider resume tube feeds in am.     Problem/Plan - 1:  ·  Problem: Upper GI bleed.  Plan: No further bleeding, on protonix drip,  consider converting drip to iv push and resuming feeds in am  Monitor hgb.      Problem/Plan - 2:  ·  Problem: Aspiration pneumonia of both lungs due to vomit, unspecified part of lung.  Planon dmission : Concern for gram negative and anaerobes, continue clinda- for now - will disuses with ID   no fever, no elevated wbc.      Problem/Plan - 3:  ·  Problem: Hypokalemia.  Plan: Replace and repeat level in am.      Problem/Plan - 4:  ·  Problem: Hypocalcemia.  Plan: Assess ionized calcium, ekg stat to evaluate for qt prolongation, pth and vitamin d levels,   replace calcium and repeat level in am.      Problem/Plan - 5:  ·  Problem: Hypophosphatemia.  Plan: replace and repeat.      Problem/Plan - 6:  Problem: Mental retardation. Plan: Supportive care.     Problem/Plan - 7:  ·  Problem: Constipation, unspecified constipation type.  Plan: Add miralax, and senna in peg. 62 year old male mentally challenged male resides in group home on peg feeds with GI bleed, aspiration pneumonia, profound electrolyte deficiencies including hypocalcemia, hypokalemia, hypomagnesemia, and hypophosphatemia. Tube feeds currently held.  Functional quadriplegia- supportive care  Severe protein calorie malnutrition- consider resume tube feeds in am.    plan-   Patient may resume all activities, diet and medications as prior to admission.

## 2018-05-28 NOTE — DISCHARGE NOTE ADULT - MEDICATION SUMMARY - MEDICATIONS TO TAKE
I will START or STAY ON the medications listed below when I get home from the hospital:    free water  -- 120 milliliter(s) by gastrostomy tube 2 times a day   -- Indication: For ELVIA (acute kidney injury)    LaMICtal 100 mg oral tablet  -- 1 tab(s) by gastrostomy tube once a day  -- Indication: For Cerebral palsy, unspecified type    LaMICtal 100 mg oral tablet  -- 0.5 tab(s) by mouth once a day (at bedtime)  -- Indication: For Mental retardation    PHENobarbital 32.4 mg oral tablet  -- 1 tab(s) by mouth 2 times a day  -- Indication: For Mental retardation    Zofran 4 mg oral tablet  -- 1 tab(s) by mouth every 6 hours, As Needed  -- Indication: For Dysphagia, unspecified type    ferrous sulfate 220 mg/5 mL (44 mg elemental iron) oral elixir  -- 5 milliliter(s) by mouth once a day  -- Indication: For Anemia    docusate sodium 100 mg oral tablet  -- 2 tab(s) by mouth 2 times a day  -- Indication: For Constipation, unspecified constipation type    Bacid (LAC) oral tablet  -- 1 tab(s) by mouth 2 times a day  -- Indication: For Dysphagia, unspecified type    omeprazole 20 mg oral delayed release capsule  -- 1 cap(s) by gastrostomy tube once a day  -- Indication: For Gastritis, presence of bleeding unspecified, unspecified chronicity, unspecified gastritis type    Vitamin C 500 mg oral tablet  -- 1 tab(s) by gastrostomy tube once a day  -- Indication: For Supplement     Vitamin D2 50,000 intl units (1.25 mg) oral capsule  -- 1 cap(s) by mouth once a month  -- Indication: For Supplement

## 2018-05-28 NOTE — PROGRESS NOTE ADULT - SUBJECTIVE AND OBJECTIVE BOX
[facility]  INFECTIOUS DISEASES AND INTERNAL MEDICINE at [place]  =======================================================  Cristian Eli MD  Diplomates American Board of Internal Medicine and Infectious Diseases  =======================================================    MARINA LEAVITT [id number]    Follow up: Pneumonia    Afebrile  No leukocytosis    Allergies:  Fish Products (Unknown)  No Known Drug Allergies      Antibiotics:  clindamycin IVPB 600 milliGRAM(s) IV Intermittent every 8 hours        REVIEW OF SYSTEMS:  Unable to obtain due to patient's medical condition      Physical Exam:  Vital Signs Last 24 Hrs  T(C): 36.8 (28 May 2018 04:04), Max: 36.8 (28 May 2018 04:04)  T(F): 98.3 (28 May 2018 04:04), Max: 98.3 (28 May 2018 04:04)  HR: 77 (28 May 2018 08:53) (77 - 98)  BP: 118/71 (28 May 2018 04:04) (118/71 - 122/86)  RR: 20 (28 May 2018 04:04) (20 - 20)  SpO2: 99% (28 May 2018 08:53) (99% - 99%)      GEN: NAD  HEENT: normocephalic and atraumatic.  NECK: Supple.   LUNGS: Clear to auscultation.  HEART: Regular rate and rhythm  ABDOMEN: Soft, nontender, and nondistended.  Positive bowel sounds.    EXTREMITIES: Without any edema. Contracted  MSK: no joint swelling  SKIN: No Rash      Labs:  05-28    139  |  104  |  6.0<L>  ----------------------------<  104  3.6   |  25.0  |  0.44<L>    Ca    8.0<L>      28 May 2018 05:46  Phos  3.5     05-28  Mg     1.7     05-28               7.3    5.8   )-----------( 216      ( 28 May 2018 08:46 )             23.4       Procalcitonin, Serum (05.26.18 @ 11:52)    Procalcitonin, Serum: <0.05: Procalcitonin (PCT) Interpretation (ng/mL) - Diagnosis of systemic  bacterial infection/sepsis  PCT < 0.5: Systemic infection (sepsis) is not likely and risk for  progression to severe systemic infection is low. Local bacterial  infection is possible. If early sepsis is suspected clinically, PCT  should be re-assessed in 6-24 hours.  PCT >/= 0.5 but < 2.0: Systemic infection (sepsis) is possible, but other  conditions are known to elevate PCT as well. Moderate risk for  progression to severe systemic infection. The patient should be closely  monitored both clinically and by re-assessing PCT within 6-24 hours.  PCT >/= 2.0 but < 10.0: Systemic infection (sepsis) is likely, unless  other causes are known. High risk of progression to severe systemic  infection (severe sepsis/septic shock).  PCT >/= 10.0: Important systemic inflammatory response, almost  exclusively due to severe bacterial sepsis or septic shock. High  likelihood of severe sepsis or septic shock. ng/mL      RECENT CULTURES:  05-26 @ 12:20    RVP  NotDete      05-26 @ 02:18 .Blood Blood-Peripheral     No growth at 48 hours      05-26 @ 02:17 .Blood Blood-Peripheral     No growth at 48 hours        EXAM:  XR CHEST AP OR PA 1V                        PROCEDURE DATE:  05/26/2018    INTERPRETATION:  Chest radiograph (one view)     CPT 56470  CLINICAL INFORMATION:  Patient is unable to communicate.  Short of breath.   TECHNIQUE:  Single frontal view of the chest was obtained. The lung   apices are not imaged on the film limiting evaluation.  FINDINGS:  No previous examinations are available for review.  The lungs are clear of infiltrate or effusion. There is partial   visualization of a right-sided central venous access catheter with the   distal tip overlying the region of the superior vena cava.    The heart and mediastinum appear intact.  Oral contrast is visualized in   loops of bowel subdiaphragmatically. There isthe suggestion of a feeding   tube apparatus in the right upper quadrant. Clinical correlation is suggested.  IMPRESSION:   Limited study. No gross consolidation is seen. Partial   visualization of a right-sided central venous access catheter with the   distal tip overlying the region of the superior vena cava.

## 2018-05-28 NOTE — PROGRESS NOTE ADULT - PROBLEM SELECTOR PLAN 1
No Pneumonia on CXR  Blood cultures no growth  RVP is negative  Procalcitonin level is <0.05 favoring no active infection  Will D/C antibiotics  Monitor off antibiotics

## 2018-05-28 NOTE — PROGRESS NOTE ADULT - PROBLEM SELECTOR PROBLEM 1
Upper GI bleed
Upper GI bleed
Aspiration pneumonia of both lungs due to vomit, unspecified part of lung
Upper GI bleed

## 2018-05-28 NOTE — DISCHARGE NOTE ADULT - PATIENT PORTAL LINK FT
You can access the [facility] Patient Portal, offered by [facility], by registering with the following website: [email or web address]

## 2018-05-28 NOTE — DISCHARGE NOTE ADULT - PLAN OF CARE
[facility]  Follow up with pmd in 5-7 days    Peg tube feeding Patient may resume all activities, diet and medications as prior to admission.

## 2018-05-28 NOTE — PROGRESS NOTE ADULT - SUBJECTIVE AND OBJECTIVE BOX
Pt seen and examined. TF restarted yesterday w/o recurrent GI bleeding. Jevity rate 50 cc./hr.    MEDICATIONS:  MEDICATIONS  (STANDING):  ALBUTerol/ipratropium for Nebulization 3 milliLiter(s) Nebulizer every 6 hours  ascorbic acid 500 milliGRAM(s) Oral daily  calcium carbonate Suspension 1250 milliGRAM(s) Oral three times a day  cholecalciferol 1000 Unit(s) Oral daily  ferrous    sulfate Liquid 300 milliGRAM(s) Enteral Tube daily  lactated ringers. 1000 milliLiter(s) (125 mL/Hr) IV Continuous <Continuous>  lactobacillus acidophilus 1 Tablet(s) Oral daily  lamoTRIgine 100 milliGRAM(s) Oral daily  lamoTRIgine 50 milliGRAM(s) Oral at bedtime  multivitamin 1 Tablet(s) Oral daily  pantoprazole  Injectable 40 milliGRAM(s) IV Push every 12 hours  PHENobarbital 32.4 milliGRAM(s) Oral two times a day  polyethylene glycol 3350 17 Gram(s) Oral daily  senna 2 Tablet(s) Oral at bedtime    MEDICATIONS  (PRN):  acetaminophen   Tablet 650 milliGRAM(s) Oral every 6 hours PRN For Temp greater than 38 C (100.4 F)  ondansetron Injectable 4 milliGRAM(s) IV Push every 6 hours PRN Nausea      Allergies    Fish Products (Unknown)  No Known Drug Allergies    Intolerances        Vital Signs Last 24 Hrs  T(C): 36.8 (28 May 2018 10:05), Max: 36.8 (28 May 2018 04:04)  T(F): 98.2 (28 May 2018 10:05), Max: 98.3 (28 May 2018 04:04)  HR: 107 (28 May 2018 10:05) (77 - 107)  BP: 142/70 (28 May 2018 10:05) (118/71 - 142/70)  BP(mean): --  RR: 20 (28 May 2018 10:05) (20 - 20)  SpO2: 100% (28 May 2018 10:05) (99% - 100%)    05-27 @ 07:01  -  05-28 @ 07:00  --------------------------------------------------------  IN: 0 mL / OUT: 1050 mL / NET: -1050 mL        PHYSICAL EXAM:    General: Well developed; well nourished; in no acute distress  HEENT: MMM, conjunctiva pink and sclera anicteric.  Lungs: clear to auscultation bilaterally.  Cor: RRR S1, S2 only.  Gastrointestinal: Abdomen: Soft non-tender non-distended; Normal bowel sounds; No hepatosplenomegaly, + G tube  Extremities: no cyanosis, clubbing or edema, contracted  Skin: Warm and dry. No obvious rash    LABS:  CBC Full  -  ( 28 May 2018 08:46 )  WBC Count : 5.8 K/uL  Hemoglobin : 7.3 g/dL  Hematocrit : 23.4 %  Platelet Count - Automated : 216 K/uL  Mean Cell Volume : 89.7 fl  Mean Cell Hemoglobin : 28.0 pg  Mean Cell Hemoglobin Concentration : 31.2 g/dL  Auto Neutrophil # : x  Auto Lymphocyte # : x  Auto Monocyte # : x  Auto Eosinophil # : x  Auto Basophil # : x  Auto Neutrophil % : x  Auto Lymphocyte % : x  Auto Monocyte % : x  Auto Eosinophil % : x  Auto Basophil % : x    05-28    139  |  104  |  6.0<L>  ----------------------------<  104  3.6   |  25.0  |  0.44<L>    Ca    8.0<L>      28 May 2018 05:46  Phos  3.5     05-28  Mg     1.7     05-28                        RADIOLOGY & ADDITIONAL STUDIES (The following images were personally reviewed):

## 2018-05-28 NOTE — DISCHARGE NOTE ADULT - SECONDARY DIAGNOSIS.
Mental retardation Gastritis, presence of bleeding unspecified, unspecified chronicity, unspecified gastritis type Aspiration pneumonia of both lungs due to vomit, unspecified part of lung ELVIA (acute kidney injury) Cerebral palsy, unspecified type

## 2018-05-28 NOTE — PROGRESS NOTE ADULT - SUBJECTIVE AND OBJECTIVE BOX
MARINA LEAVITT Patient is a 62y old  Male who presents with a chief complaint of sent from [place] for GI bleed from PEG (26 May 2018 03:41)     HPI:  Pt is a 61 y/o Male with pmh/o non-verbal and handicap at baseline, CP, MR, GERD, with two recent admissions for gastritis/PNA at [place], who lives at group home and is accompanied by aide, who presents to ED as a transfer from [facility] after pt experienced dark colored, concern for bloody coffee ground emesis which was also note from PEG tube. History taken largely from ED physician who reports one additional episode in ED. As per aide at bedside, pt never deviated from baseline mentation or behaviors. In ED prior to transfer pt received IV levaquin and zosyn and IVF, lactate originally 4.5, now to 3.5 and less than 2 in [facility] ED after additional IVF. At [place] CT A/P performed and showed no acute intra-abdominal pathology however did show bibasilar consolidations. Aide at bedside notes concern for aspiration of emesis during episode which preceded ED visit. Pt arrived tachycardic in 120's with fever both which resolved after IVF and abx. (26 May 2018 03:41)    The patient was seen and evaluated , non verbal   The patient is in no acute distress.      I&O's Summary    27 May 2018 07:01  -  28 May 2018 07:00  --------------------------------------------------------  IN: 0 mL / OUT: 1050 mL / NET: -1050 mL      Allergies    Fish Products (Unknown)  No Known Drug Allergies    Intolerances      HEALTH ISSUES - PROBLEM Dx:  Mental retardation: Mental retardation  Hypophosphatemia: Hypophosphatemia  Hypocalcemia: Hypocalcemia  Hypokalemia: Hypokalemia  Aspiration pneumonia of both lungs due to vomit, unspecified part of lung: Aspiration pneumonia of both lungs due to vomit, unspecified part of lung  Constipation, unspecified constipation type: Constipation, unspecified constipation type  ELVIA (acute kidney injury): ELVIA (acute kidney injury)  Upper GI bleed: Upper GI bleed  Aspiration into airway, initial encounter: Aspiration into airway, initial encounter  Sepsis, due to unspecified organism: Sepsis, due to unspecified organism        PAST MEDICAL & SURGICAL HISTORY:  Constipation, unspecified constipation type  Dysphagia, unspecified type  Pneumonia due to infectious organism, unspecified laterality, unspecified part of lung  Gastritis, presence of bleeding unspecified, unspecified chronicity, unspecified gastritis type  Mental retardation  Cerebral palsy, unspecified type  PEG tube malfunction          Vital Signs Last 24 Hrs  T(C): 36.8 (28 May 2018 10:05), Max: 36.8 (28 May 2018 04:04)  T(F): 98.2 (28 May 2018 10:05), Max: 98.3 (28 May 2018 04:04)  HR: 107 (28 May 2018 10:05) (77 - 107)  BP: 142/70 (28 May 2018 10:05) (118/71 - 142/70)  BP(mean): --  RR: 20 (28 May 2018 10:05) (20 - 20)  SpO2: 100% (28 May 2018 10:05) (99% - 100%)T(C): 36.8 (18 @ 10:05), Max: 36.8 (18 @ 04:04)  HR: 107 (18 @ 10:05) (77 - 107)  BP: 142/70 (18 @ 10:05) (118/71 - 142/70)  RR: 20 (18 @ 10:05) (20 - 20)  SpO2: 100% (18 @ 10:05) (99% - 100%)  Wt(kg): --    PHYSICAL EXAM:    GENERAL: NAD, lying down curled up in bed , non verbal   HEAD:  Atraumatic, Normocephalic  EYES: EOMI, PERRL  NERVOUS SYSTEM:  Alert & doesnt Move upper and lower extremities; CNS-II-XII  CHEST/LUNG: Clear to auscultation bilaterally; No rales, rhonchi, wheezing,   HEART: Regular rate and rhythm; No murmurs,   ABDOMEN: Soft, Nontender,PEG no bleeding   EXTREMITIES:  Peripheral Pulses, No  cyanosis, or edema  psychiatry- lacks  Insight and judgement intact     acetaminophen   Tablet 650 milliGRAM(s) Oral every 6 hours PRN  ALBUTerol/ipratropium for Nebulization 3 milliLiter(s) Nebulizer every 6 hours  ascorbic acid 500 milliGRAM(s) Oral daily  calcium carbonate Suspension 1250 milliGRAM(s) Oral three times a day  cholecalciferol 1000 Unit(s) Oral daily  ferrous    sulfate Liquid 300 milliGRAM(s) Enteral Tube daily  lactated ringers. 1000 milliLiter(s) IV Continuous <Continuous>  lactobacillus acidophilus 1 Tablet(s) Oral daily  lamoTRIgine 100 milliGRAM(s) Oral daily  lamoTRIgine 50 milliGRAM(s) Oral at bedtime  multivitamin 1 Tablet(s) Oral daily  ondansetron Injectable 4 milliGRAM(s) IV Push every 6 hours PRN  pantoprazole  Injectable 40 milliGRAM(s) IV Push every 12 hours  PHENobarbital 32.4 milliGRAM(s) Oral two times a day  polyethylene glycol 3350 17 Gram(s) Oral daily  senna 2 Tablet(s) Oral at bedtime      LABS:                          7.3    5.8   )-----------( 216      ( 28 May 2018 08:46 )             23.4         139  |  104  |  6.0<L>  ----------------------------<  104  3.6   |  25.0  |  0.44<L>    Ca    8.0<L>      28 May 2018 05:46  Phos  3.5       Mg     1.7                   Urinalysis Basic - ( 28 May 2018 12:13 )    Color: Yellow / Appearance: Clear / S.010 / pH: x  Gluc: x / Ketone: Negative  / Bili: Negative / Urobili: Negative mg/dL   Blood: x / Protein: 15 mg/dL / Nitrite: Negative   Leuk Esterase: Negative / RBC: x / WBC x   Sq Epi: x / Non Sq Epi: x / Bacteria: x      CAPILLARY BLOOD GLUCOSE          RADIOLOGY & ADDITIONAL TESTS:      Consultant notes reviewed    Case discussed with consultant/provider/ family /patient

## 2018-05-28 NOTE — DISCHARGE NOTE ADULT - CARE PLAN
Principal Discharge DX:	Upper GI bleed  Secondary Diagnosis:	Mental retardation Principal Discharge DX:	Upper GI bleed  Goal:	retirement care  Assessment and plan of treatment:	retirement care  Follow up with pmd in 5-7 days    Peg tube feeding  Secondary Diagnosis:	Mental retardation  Assessment and plan of treatment:	Patient may resume all activities, diet and medications as prior to admission.  Secondary Diagnosis:	Gastritis, presence of bleeding unspecified, unspecified chronicity, unspecified gastritis type  Secondary Diagnosis:	Aspiration pneumonia of both lungs due to vomit, unspecified part of lung  Secondary Diagnosis:	ELVIA (acute kidney injury)  Secondary Diagnosis:	Cerebral palsy, unspecified type

## 2018-05-29 PROBLEM — Z00.00 ENCOUNTER FOR PREVENTIVE HEALTH EXAMINATION: Status: ACTIVE | Noted: 2018-05-29

## 2018-05-29 PROCEDURE — 99233 SBSQ HOSP IP/OBS HIGH 50: CPT

## 2018-05-29 RX ORDER — LACTOBACILLUS ACIDOPHILUS 100MM CELL
1 CAPSULE ORAL
Qty: 0 | Refills: 0 | COMMUNITY

## 2018-05-29 RX ORDER — ONDANSETRON 8 MG/1
1 TABLET, FILM COATED ORAL
Qty: 0 | Refills: 0 | COMMUNITY

## 2018-05-29 RX ORDER — LAMOTRIGINE 25 MG/1
0.5 TABLET, ORALLY DISINTEGRATING ORAL
Qty: 0 | Refills: 0 | COMMUNITY

## 2018-05-29 RX ORDER — LAMOTRIGINE 25 MG/1
1 TABLET, ORALLY DISINTEGRATING ORAL
Qty: 0 | Refills: 0 | COMMUNITY

## 2018-05-29 RX ORDER — FERROUS SULFATE 325(65) MG
5 TABLET ORAL
Qty: 0 | Refills: 0 | COMMUNITY

## 2018-05-29 RX ORDER — DOCUSATE SODIUM 100 MG
2 CAPSULE ORAL
Qty: 0 | Refills: 0 | COMMUNITY

## 2018-05-29 RX ORDER — PHENOBARBITAL 60 MG
1 TABLET ORAL
Qty: 0 | Refills: 0 | COMMUNITY

## 2018-05-29 RX ORDER — ASCORBIC ACID 60 MG
1 TABLET,CHEWABLE ORAL
Qty: 0 | Refills: 0 | COMMUNITY

## 2018-05-29 RX ORDER — ERGOCALCIFEROL 1.25 MG/1
1 CAPSULE ORAL
Qty: 0 | Refills: 0 | COMMUNITY

## 2018-05-29 RX ORDER — OMEPRAZOLE 10 MG/1
1 CAPSULE, DELAYED RELEASE ORAL
Qty: 0 | Refills: 0 | COMMUNITY

## 2018-05-29 RX ADMIN — Medication 32.4 MILLIGRAM(S): at 18:51

## 2018-05-29 RX ADMIN — Medication 300 MILLIGRAM(S): at 12:57

## 2018-05-29 RX ADMIN — LAMOTRIGINE 100 MILLIGRAM(S): 25 TABLET, ORALLY DISINTEGRATING ORAL at 12:57

## 2018-05-29 RX ADMIN — SENNA PLUS 2 TABLET(S): 8.6 TABLET ORAL at 22:02

## 2018-05-29 RX ADMIN — Medication 1000 UNIT(S): at 12:57

## 2018-05-29 RX ADMIN — SODIUM CHLORIDE 125 MILLILITER(S): 9 INJECTION, SOLUTION INTRAVENOUS at 08:13

## 2018-05-29 RX ADMIN — Medication 32.4 MILLIGRAM(S): at 06:11

## 2018-05-29 RX ADMIN — Medication 500 MILLIGRAM(S): at 12:57

## 2018-05-29 RX ADMIN — Medication 3 MILLILITER(S): at 02:16

## 2018-05-29 RX ADMIN — PANTOPRAZOLE SODIUM 40 MILLIGRAM(S): 20 TABLET, DELAYED RELEASE ORAL at 18:32

## 2018-05-29 RX ADMIN — Medication 1 TABLET(S): at 12:57

## 2018-05-29 RX ADMIN — PANTOPRAZOLE SODIUM 40 MILLIGRAM(S): 20 TABLET, DELAYED RELEASE ORAL at 06:11

## 2018-05-29 RX ADMIN — Medication 3 MILLILITER(S): at 20:06

## 2018-05-29 RX ADMIN — SODIUM CHLORIDE 125 MILLILITER(S): 9 INJECTION, SOLUTION INTRAVENOUS at 18:31

## 2018-05-29 RX ADMIN — Medication 3 MILLILITER(S): at 08:55

## 2018-05-29 RX ADMIN — Medication 1250 MILLIGRAM(S): at 13:01

## 2018-05-29 RX ADMIN — Medication 1250 MILLIGRAM(S): at 06:11

## 2018-05-29 RX ADMIN — Medication 3 MILLILITER(S): at 15:30

## 2018-05-29 RX ADMIN — Medication 1250 MILLIGRAM(S): at 22:03

## 2018-05-29 RX ADMIN — LAMOTRIGINE 50 MILLIGRAM(S): 25 TABLET, ORALLY DISINTEGRATING ORAL at 22:03

## 2018-05-29 NOTE — DIETITIAN INITIAL EVALUATION ADULT. - PERTINENT LABORATORY DATA
05-28 Na139 mmol/L Glu 104 mg/dL K+ 3.6 mmol/L Cr  0.44 mg/dL<L> BUN 6.0 mg/dL<L> Phos 3.5 mg/dL Alb n/a   PAB n/a

## 2018-05-29 NOTE — DIETITIAN INITIAL EVALUATION ADULT. - PROBLEM SELECTOR PLAN 3
Protonix drip  NPO   GI consult  pt reported to have +guiac in emesis however pt is on liquid iron and suffers from gastritis  may be due to abx induced gastritis as pt has significant hx of admissions for such

## 2018-05-29 NOTE — PROGRESS NOTE ADULT - SUBJECTIVE AND OBJECTIVE BOX
CC: Mentally retarded. Functional quadriplegia. Peg tube.  GI bleed with coffee ground resolved.    HPI:  Pt is a 63 y/o Male with pmh/o non-verbal and handicap at baseline, CP, MR, GERD, with two recent admissions for gastritis/PNA at [place], who lives at group home and is accompanied by aide, who presents to ED as a transfer from [facility] after pt experienced dark colored, concern for bloody coffee ground emesis which was also note from PEG tube. History taken largely from ED physician who reports one additional episode in ED. As per aide at bedside, pt never deviated from baseline mentation or behaviors. In ED prior to transfer pt received IV levaquin and zosyn and IVF, lactate originally 4.5, now to 3.5 and less than 2 in [facility] ED after additional IVF. At [place] CT A/P performed and showed no acute intra-abdominal pathology however did show bibasilar consolidations. Aide at bedside notes concern for aspiration of emesis during episode which preceded ED visit. Pt arrived tachycardic in 120's with fever both which resolved after IVF and abx. (26 May 2018 03:41)    REVIEW OF SYSTEMS:    Patient denied fever, chills, abdominal pain, nausea, vomiting, cough, shortness of breath, chest pain or palpitations    Vital Signs Last 24 Hrs  T(C): 36.8 (29 May 2018 08:14), Max: 37.4 (28 May 2018 16:36)  T(F): 98.2 (29 May 2018 08:14), Max: 99.4 (28 May 2018 16:36)  HR: 86 (29 May 2018 08:14) (74 - 107)  BP: 112/70 (29 May 2018 08:14) (112/70 - 142/70)  BP(mean): --  RR: 18 (29 May 2018 08:14) (18 - 20)  SpO2: 99% (29 May 2018 08:14) (96% - 100%)I&O's Summary    28 May 2018 07:01  -  29 May 2018 07:00  --------------------------------------------------------  IN: 1925 mL / OUT: 2751 mL / NET: -826 mL      PHYSICAL EXAM:  GENERAL: NAD,   HEENT: PERRL, +EOMI, anicteric, no [place]  NECK: Supple, No JVD   CHEST/LUNG: CTA bilaterally; Normal effort  HEART: S1S2 Normal intensity, no murmurs, gallops or rubs noted  ABDOMEN: Soft, BS Normoactive, NT, ND, no HSM noted  EXTREMITIES:  Limb contractures No cyanosis, or edema noted, Bed bound   SKIN: No rashes or lesions noted  NEURO: None verbal . Mentally retarded. Limb contractures.   PSYCH: Non verbal , Mentally retarded.   LABS:                        7.3    5.8   )-----------( 216      ( 28 May 2018 08:46 )             23.4         139  |  104  |  6.0<L>  ----------------------------<  104  3.6   |  25.0  |  0.44<L>    Ca    8.0<L>      28 May 2018 05:46  Phos  3.5       Mg     1.7             Urinalysis Basic - ( 28 May 2018 12:13 )    Color: Yellow / Appearance: Clear / S.010 / pH: x  Gluc: x / Ketone: Negative  / Bili: Negative / Urobili: Negative mg/dL   Blood: x / Protein: 15 mg/dL / Nitrite: Negative   Leuk Esterase: Negative / RBC: 3-5 /HPF / WBC 0-2   Sq Epi: x / Non Sq Epi: Few / Bacteria: Few      RADIOLOGY & ADDITIONAL TESTS:    MEDICATIONS:  MEDICATIONS  (STANDING):  ALBUTerol/ipratropium for Nebulization 3 milliLiter(s) Nebulizer every 6 hours  ascorbic acid 500 milliGRAM(s) Oral daily  calcium carbonate Suspension 1250 milliGRAM(s) Oral three times a day  cholecalciferol 1000 Unit(s) Oral daily  ferrous    sulfate Liquid 300 milliGRAM(s) Enteral Tube daily  lactated ringers. 1000 milliLiter(s) (125 mL/Hr) IV Continuous <Continuous>  lactobacillus acidophilus 1 Tablet(s) Oral daily  lamoTRIgine 100 milliGRAM(s) Oral daily  lamoTRIgine 50 milliGRAM(s) Oral at bedtime  multivitamin 1 Tablet(s) Oral daily  pantoprazole  Injectable 40 milliGRAM(s) IV Push every 12 hours  PHENobarbital 32.4 milliGRAM(s) Oral two times a day  polyethylene glycol 3350 17 Gram(s) Oral daily  senna 2 Tablet(s) Oral at bedtime    MEDICATIONS  (PRN):  acetaminophen   Tablet 650 milliGRAM(s) Oral every 6 hours PRN For Temp greater than 38 C (100.4 F)  ondansetron Injectable 4 milliGRAM(s) IV Push every 6 hours PRN Nausea

## 2018-05-29 NOTE — PROGRESS NOTE ADULT - ASSESSMENT
62 year old male mentally challenged male resides in group home on peg feeds with GI bleed, aspiration pneumonia, profound electrolyte deficiencies including hypocalcemia, hypokalemia, hypomagnesemia, and hypophosphatemia. Tube feeds currently held.  Functional quadriplegia- supportive care  Severe protein calorie malnutrition-  resume tube feeds in am.     Problem/Plan - 1:  ·  Problem: Upper GI bleed.  Plan: No further bleeding, on protonix injection bid ,  Monitor hgb. Significant anemia.  Will transfuse if Hb 7 or less.      Problem/Plan - 2:  ·  Problem: Aspiration pneumonia of both lungs due to vomit, unspecified part of lung.  : Concern for gram negative and anaerobes,Disccontinue clinda- disusesed with ID   no fever, no elevated wbc low procalcitonin. Abx is discontinued.      Problem/Plan - 3:  ·  Problem: Hypokalemia.  Plan: Replace, normal       Problem/Plan - 4:  ·  Problem: Hypocalcemia.  Plan:  ionized calcium normal, , ekg no q-t prolongation, pth and vitamin d level slight abnormality. ,   replace calcium and repeat level in am.      Problem/Plan - 5:  ·  Problem: Hypophosphatemia.  Plan: replaced.      Problem/Plan - 6:  Problem: Mental retardation. Plan: Supportive care.     Problem/Plan - 7:  ·  Problem: Constipation, unspecified constipation type.  Plan: Add Miralax and senna in peg.     Disposition: Awaiting return to Nursing home long term care facility.

## 2018-05-29 NOTE — DIETITIAN INITIAL EVALUATION ADULT. - OTHER INFO
BMI 27.9, pt with gastritis, on peg TF here for peg tube malfunction, now tolerating Jevity 1.5cal at 50cchr BMI 27.9, pt with gastritis and GI hem resolved, now tolerating Jevity 1.5cal at 50cchr

## 2018-05-30 VITALS
HEART RATE: 84 BPM | RESPIRATION RATE: 19 BRPM | OXYGEN SATURATION: 98 % | TEMPERATURE: 98 F | SYSTOLIC BLOOD PRESSURE: 112 MMHG | DIASTOLIC BLOOD PRESSURE: 77 MMHG

## 2018-05-30 LAB
ANION GAP SERPL CALC-SCNC: 12 MMOL/L — SIGNIFICANT CHANGE UP (ref 5–17)
BUN SERPL-MCNC: 6 MG/DL — LOW (ref 8–20)
CALCIUM SERPL-MCNC: 8.9 MG/DL — SIGNIFICANT CHANGE UP (ref 8.6–10.2)
CHLORIDE SERPL-SCNC: 103 MMOL/L — SIGNIFICANT CHANGE UP (ref 98–107)
CO2 SERPL-SCNC: 26 MMOL/L — SIGNIFICANT CHANGE UP (ref 22–29)
CREAT SERPL-MCNC: 0.44 MG/DL — LOW (ref 0.5–1.3)
GLUCOSE SERPL-MCNC: 92 MG/DL — SIGNIFICANT CHANGE UP (ref 70–115)
HCT VFR BLD CALC: 28 % — LOW (ref 42–52)
HGB BLD-MCNC: 8.6 G/DL — LOW (ref 14–18)
MAGNESIUM SERPL-MCNC: 1.9 MG/DL — SIGNIFICANT CHANGE UP (ref 1.8–2.6)
MCHC RBC-ENTMCNC: 27.4 PG — SIGNIFICANT CHANGE UP (ref 27–31)
MCHC RBC-ENTMCNC: 30.7 G/DL — LOW (ref 32–36)
MCV RBC AUTO: 89.2 FL — SIGNIFICANT CHANGE UP (ref 80–94)
PLATELET # BLD AUTO: 308 K/UL — SIGNIFICANT CHANGE UP (ref 150–400)
POTASSIUM SERPL-MCNC: 4.2 MMOL/L — SIGNIFICANT CHANGE UP (ref 3.5–5.3)
POTASSIUM SERPL-SCNC: 4.2 MMOL/L — SIGNIFICANT CHANGE UP (ref 3.5–5.3)
RBC # BLD: 3.14 M/UL — LOW (ref 4.6–6.2)
RBC # FLD: 14.9 % — SIGNIFICANT CHANGE UP (ref 11–15.6)
SODIUM SERPL-SCNC: 141 MMOL/L — SIGNIFICANT CHANGE UP (ref 135–145)
WBC # BLD: 6.5 K/UL — SIGNIFICANT CHANGE UP (ref 4.8–10.8)
WBC # FLD AUTO: 6.5 K/UL — SIGNIFICANT CHANGE UP (ref 4.8–10.8)

## 2018-05-30 PROCEDURE — 99285 EMERGENCY DEPT VISIT HI MDM: CPT | Mod: 25

## 2018-05-30 PROCEDURE — 71045 X-RAY EXAM CHEST 1 VIEW: CPT

## 2018-05-30 PROCEDURE — 86900 BLOOD TYPING SEROLOGIC ABO: CPT

## 2018-05-30 PROCEDURE — 84100 ASSAY OF PHOSPHORUS: CPT

## 2018-05-30 PROCEDURE — 87581 M.PNEUMON DNA AMP PROBE: CPT

## 2018-05-30 PROCEDURE — 36556 INSERT NON-TUNNEL CV CATH: CPT

## 2018-05-30 PROCEDURE — 84145 PROCALCITONIN (PCT): CPT

## 2018-05-30 PROCEDURE — 87798 DETECT AGENT NOS DNA AMP: CPT

## 2018-05-30 PROCEDURE — 94640 AIRWAY INHALATION TREATMENT: CPT

## 2018-05-30 PROCEDURE — 99239 HOSP IP/OBS DSCHRG MGMT >30: CPT

## 2018-05-30 PROCEDURE — 36415 COLL VENOUS BLD VENIPUNCTURE: CPT

## 2018-05-30 PROCEDURE — 85730 THROMBOPLASTIN TIME PARTIAL: CPT

## 2018-05-30 PROCEDURE — 80048 BASIC METABOLIC PNL TOTAL CA: CPT

## 2018-05-30 PROCEDURE — 82330 ASSAY OF CALCIUM: CPT

## 2018-05-30 PROCEDURE — 82306 VITAMIN D 25 HYDROXY: CPT

## 2018-05-30 PROCEDURE — C1751: CPT

## 2018-05-30 PROCEDURE — 83605 ASSAY OF LACTIC ACID: CPT

## 2018-05-30 PROCEDURE — 83970 ASSAY OF PARATHORMONE: CPT

## 2018-05-30 PROCEDURE — 51702 INSERT TEMP BLADDER CATH: CPT | Mod: XU

## 2018-05-30 PROCEDURE — 87040 BLOOD CULTURE FOR BACTERIA: CPT

## 2018-05-30 PROCEDURE — 83735 ASSAY OF MAGNESIUM: CPT

## 2018-05-30 PROCEDURE — 82310 ASSAY OF CALCIUM: CPT

## 2018-05-30 PROCEDURE — 86850 RBC ANTIBODY SCREEN: CPT

## 2018-05-30 PROCEDURE — 85027 COMPLETE CBC AUTOMATED: CPT

## 2018-05-30 PROCEDURE — 86901 BLOOD TYPING SEROLOGIC RH(D): CPT

## 2018-05-30 PROCEDURE — 81001 URINALYSIS AUTO W/SCOPE: CPT

## 2018-05-30 PROCEDURE — 80053 COMPREHEN METABOLIC PANEL: CPT

## 2018-05-30 PROCEDURE — 93005 ELECTROCARDIOGRAM TRACING: CPT

## 2018-05-30 PROCEDURE — 85610 PROTHROMBIN TIME: CPT

## 2018-05-30 PROCEDURE — 87633 RESP VIRUS 12-25 TARGETS: CPT

## 2018-05-30 PROCEDURE — 87486 CHLMYD PNEUM DNA AMP PROBE: CPT

## 2018-05-30 RX ADMIN — Medication 3 MILLILITER(S): at 08:22

## 2018-05-30 RX ADMIN — Medication 1250 MILLIGRAM(S): at 06:02

## 2018-05-30 RX ADMIN — Medication 3 MILLILITER(S): at 02:23

## 2018-05-30 RX ADMIN — PANTOPRAZOLE SODIUM 40 MILLIGRAM(S): 20 TABLET, DELAYED RELEASE ORAL at 06:02

## 2018-05-30 RX ADMIN — Medication 32.4 MILLIGRAM(S): at 06:02

## 2018-05-30 NOTE — PROGRESS NOTE ADULT - SUBJECTIVE AND OBJECTIVE BOX
CC: GI bleed is resolved. Patient at baseline and awaiting return to nursing home .   HPI:  Pt is a 61 y/o Male with pmh/o non-verbal and handicap at baseline, CP, MR, GERD, with two recent admissions for gastritis/PNA at [place], who lives at group home and is accompanied by aide, who presents to ED as a transfer from [facility] after pt experienced dark colored, concern for bloody coffee ground emesis which was also note from PEG tube. History taken largely from ED physician who reports one additional episode in ED. As per aide at bedside, pt never deviated from baseline mentation or behaviors. In ED prior to transfer pt received IV levaquin and zosyn and IVF, lactate originally 4.5, now to 3.5 and less than 2 in [facility] ED after additional IVF. At [place] CT A/P performed and showed no acute intra-abdominal pathology however did show bibasilar consolidations. Aide at bedside notes concern for aspiration of emesis during episode which preceded ED visit. Pt arrived tachycardic in 120's with fever both which resolved after IVF and abx. (26 May 2018 03:41)    REVIEW OF SYSTEMS:    Patient denied fever, chills, abdominal pain, nausea, vomiting, cough, shortness of breath, chest pain or palpitations    Vital Signs Last 24 Hrs  T(C): 36.6 (30 May 2018 13:21), Max: 36.8 (29 May 2018 17:03)  T(F): 97.9 (30 May 2018 13:21), Max: 98.3 (30 May 2018 04:53)  HR: 84 (30 May 2018 13:21) (76 - 96)  BP: 112/77 (30 May 2018 13:21) (105/72 - 134/72)  BP(mean): --  RR: 19 (30 May 2018 13:21) (17 - 20)  SpO2: 98% (30 May 2018 13:21) (95% - 100%)I&O's Summary    29 May 2018 07:01  -  30 May 2018 07:00  --------------------------------------------------------  IN: 3425 mL / OUT: 4326 mL / NET: -901 mL    30 May 2018 07:01  -  30 May 2018 13:53  --------------------------------------------------------  IN: 0 mL / OUT: 700 mL / NET: -700 mL      PHYSICAL EXAM:  GENERAL: NAD,   HEENT: PERRL, +EOMI, anicteric, no Cahto  NECK: Supple, No JVD   CHEST/LUNG: CTA bilaterally; Normal effort  HEART: S1S2 Normal intensity, no murmurs, gallops or rubs noted  ABDOMEN: Soft, BS Normoactive, NT, ND, no HSM noted  EXTREMITIES:  2+ radial and DP pulses noted, no clubbing, cyanosis, or edema noted, bed bound   SKIN: No rashes or lesions noted  NEURO: Non verbal, mental retardation , contractures of all limbs   PSYCH: Non verbal  mood and affect; insight/judgement inappropriate  LABS:                        8.6    6.5   )-----------( 308      ( 30 May 2018 07:04 )             28.0     05-30    141  |  103  |  6.0<L>  ----------------------------<  92  4.2   |  26.0  |  0.44<L>    Ca    8.9      30 May 2018 07:04  Mg     1.9     05-30          RADIOLOGY & ADDITIONAL TESTS:    MEDICATIONS:  MEDICATIONS  (STANDING):  ALBUTerol/ipratropium for Nebulization 3 milliLiter(s) Nebulizer every 6 hours  ascorbic acid 500 milliGRAM(s) Oral daily  calcium carbonate Suspension 1250 milliGRAM(s) Oral three times a day  cholecalciferol 1000 Unit(s) Oral daily  ferrous    sulfate Liquid 300 milliGRAM(s) Enteral Tube daily  lactated ringers. 1000 milliLiter(s) (125 mL/Hr) IV Continuous <Continuous>  lactobacillus acidophilus 1 Tablet(s) Oral daily  lamoTRIgine 100 milliGRAM(s) Oral daily  lamoTRIgine 50 milliGRAM(s) Oral at bedtime  multivitamin 1 Tablet(s) Oral daily  pantoprazole  Injectable 40 milliGRAM(s) IV Push every 12 hours  PHENobarbital 32.4 milliGRAM(s) Oral two times a day  polyethylene glycol 3350 17 Gram(s) Oral daily  senna 2 Tablet(s) Oral at bedtime    MEDICATIONS  (PRN):  acetaminophen   Tablet 650 milliGRAM(s) Oral every 6 hours PRN For Temp greater than 38 C (100.4 F)  ondansetron Injectable 4 milliGRAM(s) IV Push every 6 hours PRN Nausea

## 2018-05-30 NOTE — PROGRESS NOTE ADULT - PROVIDER SPECIALTY LIST ADULT
Gastroenterology
Gastroenterology
Hospitalist
Infectious Disease
Hospitalist

## 2018-05-31 LAB
CULTURE RESULTS: SIGNIFICANT CHANGE UP
CULTURE RESULTS: SIGNIFICANT CHANGE UP
SPECIMEN SOURCE: SIGNIFICANT CHANGE UP
SPECIMEN SOURCE: SIGNIFICANT CHANGE UP

## 2018-06-01 ENCOUNTER — INPATIENT (INPATIENT)
Facility: HOSPITAL | Age: 63
LOS: 3 days | Discharge: HOME | End: 2018-06-05
Payer: MEDICARE

## 2018-06-01 ENCOUNTER — OUTPATIENT (OUTPATIENT)
Dept: OUTPATIENT SERVICES | Facility: HOSPITAL | Age: 63
LOS: 1 days | End: 2018-06-01

## 2018-06-01 DIAGNOSIS — K94.23 GASTROSTOMY MALFUNCTION: Chronic | ICD-10-CM

## 2018-06-01 PROCEDURE — 99285 EMERGENCY DEPT VISIT HI MDM: CPT

## 2018-06-01 PROCEDURE — 74177 CT ABD & PELVIS W/CONTRAST: CPT | Mod: 26

## 2018-06-01 PROCEDURE — 71045 X-RAY EXAM CHEST 1 VIEW: CPT | Mod: 26

## 2018-06-02 ENCOUNTER — OUTPATIENT (OUTPATIENT)
Dept: OUTPATIENT SERVICES | Facility: HOSPITAL | Age: 63
LOS: 1 days | End: 2018-06-02

## 2018-06-02 DIAGNOSIS — K94.23 GASTROSTOMY MALFUNCTION: Chronic | ICD-10-CM

## 2018-06-03 ENCOUNTER — OUTPATIENT (OUTPATIENT)
Dept: OUTPATIENT SERVICES | Facility: HOSPITAL | Age: 63
LOS: 1 days | End: 2018-06-03

## 2018-06-03 DIAGNOSIS — K94.23 GASTROSTOMY MALFUNCTION: Chronic | ICD-10-CM

## 2018-06-04 ENCOUNTER — OUTPATIENT (OUTPATIENT)
Dept: OUTPATIENT SERVICES | Facility: HOSPITAL | Age: 63
LOS: 1 days | End: 2018-06-04

## 2018-06-04 DIAGNOSIS — K94.23 GASTROSTOMY MALFUNCTION: Chronic | ICD-10-CM

## 2018-06-05 ENCOUNTER — OUTPATIENT (OUTPATIENT)
Dept: OUTPATIENT SERVICES | Facility: HOSPITAL | Age: 63
LOS: 1 days | End: 2018-06-05

## 2018-06-05 DIAGNOSIS — K94.23 GASTROSTOMY MALFUNCTION: Chronic | ICD-10-CM

## 2018-06-07 ENCOUNTER — OUTPATIENT (OUTPATIENT)
Dept: OUTPATIENT SERVICES | Facility: HOSPITAL | Age: 63
LOS: 1 days | End: 2018-06-07

## 2018-06-07 DIAGNOSIS — K94.23 GASTROSTOMY MALFUNCTION: Chronic | ICD-10-CM

## 2018-06-07 PROCEDURE — 99285 EMERGENCY DEPT VISIT HI MDM: CPT

## 2018-06-07 PROCEDURE — 71045 X-RAY EXAM CHEST 1 VIEW: CPT | Mod: 26

## 2018-06-08 PROCEDURE — 74245: CPT | Mod: 26

## 2018-06-09 ENCOUNTER — OUTPATIENT (OUTPATIENT)
Dept: OUTPATIENT SERVICES | Facility: HOSPITAL | Age: 63
LOS: 1 days | End: 2018-06-09

## 2018-06-09 DIAGNOSIS — K94.23 GASTROSTOMY MALFUNCTION: Chronic | ICD-10-CM

## 2018-06-10 ENCOUNTER — OUTPATIENT (OUTPATIENT)
Dept: OUTPATIENT SERVICES | Facility: HOSPITAL | Age: 63
LOS: 1 days | End: 2018-06-10

## 2018-06-10 DIAGNOSIS — K94.23 GASTROSTOMY MALFUNCTION: Chronic | ICD-10-CM

## 2018-06-11 ENCOUNTER — INPATIENT (INPATIENT)
Facility: HOSPITAL | Age: 63
LOS: 1 days | Discharge: HOME | End: 2018-06-13
Payer: MEDICARE

## 2018-06-11 ENCOUNTER — OUTPATIENT (OUTPATIENT)
Dept: OUTPATIENT SERVICES | Facility: HOSPITAL | Age: 63
LOS: 1 days | End: 2018-06-11

## 2018-06-11 DIAGNOSIS — K94.23 GASTROSTOMY MALFUNCTION: Chronic | ICD-10-CM

## 2018-06-12 ENCOUNTER — OUTPATIENT (OUTPATIENT)
Dept: OUTPATIENT SERVICES | Facility: HOSPITAL | Age: 63
LOS: 1 days | End: 2018-06-12

## 2018-06-12 DIAGNOSIS — K94.23 GASTROSTOMY MALFUNCTION: Chronic | ICD-10-CM

## 2018-09-25 ENCOUNTER — INPATIENT (INPATIENT)
Facility: HOSPITAL | Age: 63
LOS: 5 days | Discharge: HOME | End: 2018-10-01
Payer: MEDICARE

## 2018-09-25 ENCOUNTER — OUTPATIENT (OUTPATIENT)
Dept: OUTPATIENT SERVICES | Facility: HOSPITAL | Age: 63
LOS: 1 days | End: 2018-09-25

## 2018-09-25 DIAGNOSIS — K94.23 GASTROSTOMY MALFUNCTION: Chronic | ICD-10-CM

## 2018-09-25 PROCEDURE — 99285 EMERGENCY DEPT VISIT HI MDM: CPT

## 2018-09-25 PROCEDURE — 71045 X-RAY EXAM CHEST 1 VIEW: CPT | Mod: 26

## 2018-09-26 ENCOUNTER — OUTPATIENT (OUTPATIENT)
Dept: OUTPATIENT SERVICES | Facility: HOSPITAL | Age: 63
LOS: 1 days | End: 2018-09-26

## 2018-09-26 DIAGNOSIS — K94.23 GASTROSTOMY MALFUNCTION: Chronic | ICD-10-CM

## 2018-09-27 ENCOUNTER — OUTPATIENT (OUTPATIENT)
Dept: OUTPATIENT SERVICES | Facility: HOSPITAL | Age: 63
LOS: 1 days | End: 2018-09-27

## 2018-09-27 DIAGNOSIS — K94.23 GASTROSTOMY MALFUNCTION: Chronic | ICD-10-CM

## 2018-09-27 PROCEDURE — 71260 CT THORAX DX C+: CPT | Mod: 26

## 2018-09-27 PROCEDURE — 74177 CT ABD & PELVIS W/CONTRAST: CPT | Mod: 26

## 2018-09-28 ENCOUNTER — OUTPATIENT (OUTPATIENT)
Dept: OUTPATIENT SERVICES | Facility: HOSPITAL | Age: 63
LOS: 1 days | End: 2018-09-28

## 2018-09-28 DIAGNOSIS — K94.23 GASTROSTOMY MALFUNCTION: Chronic | ICD-10-CM

## 2018-09-29 ENCOUNTER — OUTPATIENT (OUTPATIENT)
Dept: OUTPATIENT SERVICES | Facility: HOSPITAL | Age: 63
LOS: 1 days | End: 2018-09-29

## 2018-09-29 DIAGNOSIS — K94.23 GASTROSTOMY MALFUNCTION: Chronic | ICD-10-CM

## 2018-09-30 ENCOUNTER — OUTPATIENT (OUTPATIENT)
Dept: OUTPATIENT SERVICES | Facility: HOSPITAL | Age: 63
LOS: 1 days | End: 2018-09-30

## 2018-09-30 DIAGNOSIS — K94.23 GASTROSTOMY MALFUNCTION: Chronic | ICD-10-CM

## 2018-10-01 ENCOUNTER — OUTPATIENT (OUTPATIENT)
Dept: OUTPATIENT SERVICES | Facility: HOSPITAL | Age: 63
LOS: 1 days | End: 2018-10-01

## 2018-10-01 DIAGNOSIS — K94.23 GASTROSTOMY MALFUNCTION: Chronic | ICD-10-CM

## 2018-11-15 ENCOUNTER — INPATIENT (INPATIENT)
Facility: HOSPITAL | Age: 63
LOS: 11 days | Discharge: HOME | End: 2018-11-27
Attending: FAMILY MEDICINE
Payer: MEDICARE

## 2018-11-15 DIAGNOSIS — K94.23 GASTROSTOMY MALFUNCTION: Chronic | ICD-10-CM

## 2018-11-15 PROCEDURE — 76937 US GUIDE VASCULAR ACCESS: CPT | Mod: 26

## 2018-11-15 PROCEDURE — 99285 EMERGENCY DEPT VISIT HI MDM: CPT | Mod: 25

## 2018-11-15 PROCEDURE — 36556 INSERT NON-TUNNEL CV CATH: CPT

## 2018-11-15 PROCEDURE — 71045 X-RAY EXAM CHEST 1 VIEW: CPT | Mod: 26

## 2018-11-16 ENCOUNTER — OUTPATIENT (OUTPATIENT)
Dept: OUTPATIENT SERVICES | Facility: HOSPITAL | Age: 63
LOS: 1 days | End: 2018-11-16

## 2018-11-16 DIAGNOSIS — K94.23 GASTROSTOMY MALFUNCTION: Chronic | ICD-10-CM

## 2018-11-16 PROCEDURE — 71250 CT THORAX DX C-: CPT | Mod: 26

## 2018-11-17 ENCOUNTER — OUTPATIENT (OUTPATIENT)
Dept: OUTPATIENT SERVICES | Facility: HOSPITAL | Age: 63
LOS: 1 days | End: 2018-11-17

## 2018-11-17 DIAGNOSIS — K94.23 GASTROSTOMY MALFUNCTION: Chronic | ICD-10-CM

## 2018-11-18 ENCOUNTER — OUTPATIENT (OUTPATIENT)
Dept: OUTPATIENT SERVICES | Facility: HOSPITAL | Age: 63
LOS: 1 days | End: 2018-11-18

## 2018-11-18 DIAGNOSIS — K94.23 GASTROSTOMY MALFUNCTION: Chronic | ICD-10-CM

## 2018-11-18 PROCEDURE — 74176 CT ABD & PELVIS W/O CONTRAST: CPT | Mod: 26

## 2018-11-19 ENCOUNTER — OUTPATIENT (OUTPATIENT)
Dept: OUTPATIENT SERVICES | Facility: HOSPITAL | Age: 63
LOS: 1 days | End: 2018-11-19

## 2018-11-19 DIAGNOSIS — K94.23 GASTROSTOMY MALFUNCTION: Chronic | ICD-10-CM

## 2018-11-20 ENCOUNTER — OUTPATIENT (OUTPATIENT)
Dept: OUTPATIENT SERVICES | Facility: HOSPITAL | Age: 63
LOS: 1 days | End: 2018-11-20

## 2018-11-20 DIAGNOSIS — K94.23 GASTROSTOMY MALFUNCTION: Chronic | ICD-10-CM

## 2018-11-20 PROCEDURE — 76937 US GUIDE VASCULAR ACCESS: CPT | Mod: 26

## 2018-11-20 PROCEDURE — 36410 VNPNXR 3YR/> PHY/QHP DX/THER: CPT

## 2018-11-20 PROCEDURE — 71045 X-RAY EXAM CHEST 1 VIEW: CPT | Mod: 26

## 2018-11-21 ENCOUNTER — OUTPATIENT (OUTPATIENT)
Dept: OUTPATIENT SERVICES | Facility: HOSPITAL | Age: 63
LOS: 1 days | End: 2018-11-21

## 2018-11-21 DIAGNOSIS — K94.23 GASTROSTOMY MALFUNCTION: Chronic | ICD-10-CM

## 2018-11-22 ENCOUNTER — OUTPATIENT (OUTPATIENT)
Dept: OUTPATIENT SERVICES | Facility: HOSPITAL | Age: 63
LOS: 1 days | End: 2018-11-22

## 2018-11-22 DIAGNOSIS — K94.23 GASTROSTOMY MALFUNCTION: Chronic | ICD-10-CM

## 2018-11-23 ENCOUNTER — OUTPATIENT (OUTPATIENT)
Dept: OUTPATIENT SERVICES | Facility: HOSPITAL | Age: 63
LOS: 1 days | End: 2018-11-23

## 2018-11-23 DIAGNOSIS — K94.23 GASTROSTOMY MALFUNCTION: Chronic | ICD-10-CM

## 2018-11-23 PROCEDURE — 71250 CT THORAX DX C-: CPT | Mod: 26

## 2018-11-24 ENCOUNTER — OUTPATIENT (OUTPATIENT)
Dept: OUTPATIENT SERVICES | Facility: HOSPITAL | Age: 63
LOS: 1 days | End: 2018-11-24

## 2018-11-24 DIAGNOSIS — K94.23 GASTROSTOMY MALFUNCTION: Chronic | ICD-10-CM

## 2018-11-25 ENCOUNTER — OUTPATIENT (OUTPATIENT)
Dept: OUTPATIENT SERVICES | Facility: HOSPITAL | Age: 63
LOS: 1 days | End: 2018-11-25

## 2018-11-25 DIAGNOSIS — K94.23 GASTROSTOMY MALFUNCTION: Chronic | ICD-10-CM

## 2018-11-26 ENCOUNTER — OUTPATIENT (OUTPATIENT)
Dept: OUTPATIENT SERVICES | Facility: HOSPITAL | Age: 63
LOS: 1 days | End: 2018-11-26

## 2018-11-26 DIAGNOSIS — K94.23 GASTROSTOMY MALFUNCTION: Chronic | ICD-10-CM

## 2018-11-27 ENCOUNTER — OUTPATIENT (OUTPATIENT)
Dept: OUTPATIENT SERVICES | Facility: HOSPITAL | Age: 63
LOS: 1 days | End: 2018-11-27

## 2018-11-27 DIAGNOSIS — K94.23 GASTROSTOMY MALFUNCTION: Chronic | ICD-10-CM

## 2019-09-14 ENCOUNTER — INPATIENT (INPATIENT)
Facility: HOSPITAL | Age: 64
LOS: 2 days | Discharge: HOME | End: 2019-09-17
Payer: MEDICARE

## 2019-09-14 ENCOUNTER — OUTPATIENT (OUTPATIENT)
Dept: OUTPATIENT SERVICES | Facility: HOSPITAL | Age: 64
LOS: 1 days | End: 2019-09-14

## 2019-09-14 DIAGNOSIS — K94.23 GASTROSTOMY MALFUNCTION: Chronic | ICD-10-CM

## 2019-09-14 PROCEDURE — 71045 X-RAY EXAM CHEST 1 VIEW: CPT | Mod: 26

## 2019-09-14 PROCEDURE — 99285 EMERGENCY DEPT VISIT HI MDM: CPT

## 2019-09-14 PROCEDURE — 74018 RADEX ABDOMEN 1 VIEW: CPT | Mod: 26

## 2019-09-14 PROCEDURE — 93010 ELECTROCARDIOGRAM REPORT: CPT

## 2019-09-15 ENCOUNTER — OUTPATIENT (OUTPATIENT)
Dept: OUTPATIENT SERVICES | Facility: HOSPITAL | Age: 64
LOS: 1 days | End: 2019-09-15

## 2019-09-15 DIAGNOSIS — K94.23 GASTROSTOMY MALFUNCTION: Chronic | ICD-10-CM

## 2019-09-16 ENCOUNTER — OUTPATIENT (OUTPATIENT)
Dept: OUTPATIENT SERVICES | Facility: HOSPITAL | Age: 64
LOS: 1 days | End: 2019-09-16

## 2019-09-16 DIAGNOSIS — K94.23 GASTROSTOMY MALFUNCTION: Chronic | ICD-10-CM

## 2019-10-01 ENCOUNTER — EMERGENCY (EMERGENCY)
Facility: HOSPITAL | Age: 64
LOS: 1 days | End: 2019-10-01
Admitting: EMERGENCY MEDICINE
Payer: MEDICARE

## 2019-10-01 DIAGNOSIS — K94.23 GASTROSTOMY MALFUNCTION: Chronic | ICD-10-CM

## 2019-10-01 PROCEDURE — 99284 EMERGENCY DEPT VISIT MOD MDM: CPT

## 2019-10-02 PROCEDURE — 93010 ELECTROCARDIOGRAM REPORT: CPT

## 2019-10-02 PROCEDURE — 71045 X-RAY EXAM CHEST 1 VIEW: CPT | Mod: 26

## 2019-12-27 ENCOUNTER — OUTPATIENT (OUTPATIENT)
Dept: OUTPATIENT SERVICES | Facility: HOSPITAL | Age: 64
LOS: 1 days | End: 2019-12-27

## 2019-12-27 DIAGNOSIS — K94.23 GASTROSTOMY MALFUNCTION: Chronic | ICD-10-CM

## 2020-03-19 ENCOUNTER — INPATIENT (INPATIENT)
Facility: HOSPITAL | Age: 65
LOS: 3 days | Discharge: HOME | End: 2020-03-23
Payer: MEDICARE

## 2020-03-19 ENCOUNTER — OUTPATIENT (OUTPATIENT)
Dept: OUTPATIENT SERVICES | Facility: HOSPITAL | Age: 65
LOS: 1 days | End: 2020-03-19

## 2020-03-19 DIAGNOSIS — K94.23 GASTROSTOMY MALFUNCTION: Chronic | ICD-10-CM

## 2020-03-19 PROCEDURE — 99291 CRITICAL CARE FIRST HOUR: CPT

## 2020-03-19 PROCEDURE — 71045 X-RAY EXAM CHEST 1 VIEW: CPT | Mod: 26

## 2020-03-19 PROCEDURE — 71260 CT THORAX DX C+: CPT | Mod: 26

## 2020-03-19 PROCEDURE — 74177 CT ABD & PELVIS W/CONTRAST: CPT | Mod: 26

## 2020-03-20 ENCOUNTER — OUTPATIENT (OUTPATIENT)
Dept: OUTPATIENT SERVICES | Facility: HOSPITAL | Age: 65
LOS: 1 days | End: 2020-03-20

## 2020-03-20 DIAGNOSIS — K94.23 GASTROSTOMY MALFUNCTION: Chronic | ICD-10-CM

## 2020-03-21 ENCOUNTER — OUTPATIENT (OUTPATIENT)
Dept: OUTPATIENT SERVICES | Facility: HOSPITAL | Age: 65
LOS: 1 days | End: 2020-03-21

## 2020-03-21 DIAGNOSIS — K94.23 GASTROSTOMY MALFUNCTION: Chronic | ICD-10-CM

## 2020-03-22 ENCOUNTER — OUTPATIENT (OUTPATIENT)
Dept: OUTPATIENT SERVICES | Facility: HOSPITAL | Age: 65
LOS: 1 days | End: 2020-03-22

## 2020-03-22 DIAGNOSIS — K94.23 GASTROSTOMY MALFUNCTION: Chronic | ICD-10-CM

## 2020-03-23 ENCOUNTER — OUTPATIENT (OUTPATIENT)
Dept: OUTPATIENT SERVICES | Facility: HOSPITAL | Age: 65
LOS: 1 days | End: 2020-03-23

## 2020-03-23 DIAGNOSIS — K94.23 GASTROSTOMY MALFUNCTION: Chronic | ICD-10-CM

## 2020-03-27 ENCOUNTER — INPATIENT (INPATIENT)
Facility: HOSPITAL | Age: 65
LOS: 3 days | Discharge: HOME | End: 2020-03-31
Admitting: STUDENT IN AN ORGANIZED HEALTH CARE EDUCATION/TRAINING PROGRAM
Payer: MEDICARE

## 2020-03-27 ENCOUNTER — OUTPATIENT (OUTPATIENT)
Dept: OUTPATIENT SERVICES | Facility: HOSPITAL | Age: 65
LOS: 1 days | End: 2020-03-27

## 2020-03-27 DIAGNOSIS — K94.23 GASTROSTOMY MALFUNCTION: Chronic | ICD-10-CM

## 2020-03-27 PROCEDURE — 74018 RADEX ABDOMEN 1 VIEW: CPT | Mod: 26

## 2020-03-27 PROCEDURE — 99284 EMERGENCY DEPT VISIT MOD MDM: CPT

## 2020-03-27 PROCEDURE — 71045 X-RAY EXAM CHEST 1 VIEW: CPT | Mod: 26

## 2020-03-28 ENCOUNTER — OUTPATIENT (OUTPATIENT)
Dept: OUTPATIENT SERVICES | Facility: HOSPITAL | Age: 65
LOS: 1 days | End: 2020-03-28

## 2020-03-28 DIAGNOSIS — K94.23 GASTROSTOMY MALFUNCTION: Chronic | ICD-10-CM

## 2020-03-29 ENCOUNTER — OUTPATIENT (OUTPATIENT)
Dept: OUTPATIENT SERVICES | Facility: HOSPITAL | Age: 65
LOS: 1 days | End: 2020-03-29

## 2020-03-29 DIAGNOSIS — K94.23 GASTROSTOMY MALFUNCTION: Chronic | ICD-10-CM

## 2020-03-30 ENCOUNTER — OUTPATIENT (OUTPATIENT)
Dept: OUTPATIENT SERVICES | Facility: HOSPITAL | Age: 65
LOS: 1 days | End: 2020-03-30

## 2020-03-30 DIAGNOSIS — K94.23 GASTROSTOMY MALFUNCTION: Chronic | ICD-10-CM

## 2020-03-31 ENCOUNTER — OUTPATIENT (OUTPATIENT)
Dept: OUTPATIENT SERVICES | Facility: HOSPITAL | Age: 65
LOS: 1 days | End: 2020-03-31

## 2020-03-31 DIAGNOSIS — K94.23 GASTROSTOMY MALFUNCTION: Chronic | ICD-10-CM

## 2020-04-05 ENCOUNTER — EMERGENCY (EMERGENCY)
Facility: HOSPITAL | Age: 65
LOS: 1 days | End: 2020-04-05
Admitting: EMERGENCY MEDICINE
Payer: MEDICARE

## 2020-04-05 DIAGNOSIS — K94.23 GASTROSTOMY MALFUNCTION: Chronic | ICD-10-CM

## 2020-04-05 PROCEDURE — 99284 EMERGENCY DEPT VISIT MOD MDM: CPT

## 2020-04-05 PROCEDURE — 71045 X-RAY EXAM CHEST 1 VIEW: CPT | Mod: 26

## 2020-04-12 ENCOUNTER — OUTPATIENT (OUTPATIENT)
Dept: OUTPATIENT SERVICES | Facility: HOSPITAL | Age: 65
LOS: 1 days | End: 2020-04-12

## 2020-04-12 ENCOUNTER — INPATIENT (INPATIENT)
Facility: HOSPITAL | Age: 65
LOS: 7 days | Discharge: HOME | End: 2020-04-20
Admitting: INTERNAL MEDICINE
Payer: MEDICARE

## 2020-04-12 DIAGNOSIS — K94.23 GASTROSTOMY MALFUNCTION: Chronic | ICD-10-CM

## 2020-04-12 PROCEDURE — 99285 EMERGENCY DEPT VISIT HI MDM: CPT | Mod: 25

## 2020-04-12 PROCEDURE — 71045 X-RAY EXAM CHEST 1 VIEW: CPT | Mod: 26

## 2020-04-12 PROCEDURE — 76937 US GUIDE VASCULAR ACCESS: CPT | Mod: 26

## 2020-04-13 ENCOUNTER — OUTPATIENT (OUTPATIENT)
Dept: OUTPATIENT SERVICES | Facility: HOSPITAL | Age: 65
LOS: 1 days | End: 2020-04-13

## 2020-04-13 DIAGNOSIS — K94.23 GASTROSTOMY MALFUNCTION: Chronic | ICD-10-CM

## 2020-04-14 ENCOUNTER — OUTPATIENT (OUTPATIENT)
Dept: OUTPATIENT SERVICES | Facility: HOSPITAL | Age: 65
LOS: 1 days | End: 2020-04-14

## 2020-04-14 DIAGNOSIS — K94.23 GASTROSTOMY MALFUNCTION: Chronic | ICD-10-CM

## 2020-04-14 PROCEDURE — 71260 CT THORAX DX C+: CPT | Mod: 26

## 2020-04-14 PROCEDURE — 74177 CT ABD & PELVIS W/CONTRAST: CPT | Mod: 26

## 2020-04-14 PROCEDURE — 93970 EXTREMITY STUDY: CPT | Mod: 26

## 2020-04-15 ENCOUNTER — OUTPATIENT (OUTPATIENT)
Dept: OUTPATIENT SERVICES | Facility: HOSPITAL | Age: 65
LOS: 1 days | End: 2020-04-15

## 2020-04-15 DIAGNOSIS — K94.23 GASTROSTOMY MALFUNCTION: Chronic | ICD-10-CM

## 2020-04-15 PROCEDURE — 76705 ECHO EXAM OF ABDOMEN: CPT | Mod: 26

## 2020-04-16 ENCOUNTER — OUTPATIENT (OUTPATIENT)
Dept: OUTPATIENT SERVICES | Facility: HOSPITAL | Age: 65
LOS: 1 days | End: 2020-04-16

## 2020-04-16 DIAGNOSIS — K94.23 GASTROSTOMY MALFUNCTION: Chronic | ICD-10-CM

## 2020-04-17 ENCOUNTER — OUTPATIENT (OUTPATIENT)
Dept: OUTPATIENT SERVICES | Facility: HOSPITAL | Age: 65
LOS: 1 days | End: 2020-04-17

## 2020-04-17 DIAGNOSIS — K94.23 GASTROSTOMY MALFUNCTION: Chronic | ICD-10-CM

## 2020-04-18 ENCOUNTER — OUTPATIENT (OUTPATIENT)
Dept: OUTPATIENT SERVICES | Facility: HOSPITAL | Age: 65
LOS: 1 days | End: 2020-04-18

## 2020-04-18 DIAGNOSIS — K94.23 GASTROSTOMY MALFUNCTION: Chronic | ICD-10-CM

## 2020-04-19 ENCOUNTER — OUTPATIENT (OUTPATIENT)
Dept: OUTPATIENT SERVICES | Facility: HOSPITAL | Age: 65
LOS: 1 days | End: 2020-04-19

## 2020-04-19 DIAGNOSIS — K94.23 GASTROSTOMY MALFUNCTION: Chronic | ICD-10-CM

## 2020-04-20 ENCOUNTER — OUTPATIENT (OUTPATIENT)
Dept: OUTPATIENT SERVICES | Facility: HOSPITAL | Age: 65
LOS: 1 days | End: 2020-04-20

## 2020-04-20 DIAGNOSIS — K94.23 GASTROSTOMY MALFUNCTION: Chronic | ICD-10-CM

## 2020-04-27 ENCOUNTER — INPATIENT (INPATIENT)
Facility: HOSPITAL | Age: 65
LOS: 7 days | Discharge: HOME | End: 2020-05-05
Attending: FAMILY MEDICINE | Admitting: INTERNAL MEDICINE
Payer: MEDICARE

## 2020-04-27 ENCOUNTER — OUTPATIENT (OUTPATIENT)
Dept: OUTPATIENT SERVICES | Facility: HOSPITAL | Age: 65
LOS: 1 days | End: 2020-04-27

## 2020-04-27 DIAGNOSIS — K94.23 GASTROSTOMY MALFUNCTION: Chronic | ICD-10-CM

## 2020-04-27 PROCEDURE — 99285 EMERGENCY DEPT VISIT HI MDM: CPT

## 2020-04-29 ENCOUNTER — OUTPATIENT (OUTPATIENT)
Dept: OUTPATIENT SERVICES | Facility: HOSPITAL | Age: 65
LOS: 1 days | End: 2020-04-29

## 2020-04-29 DIAGNOSIS — K94.23 GASTROSTOMY MALFUNCTION: Chronic | ICD-10-CM

## 2020-04-30 ENCOUNTER — OUTPATIENT (OUTPATIENT)
Dept: OUTPATIENT SERVICES | Facility: HOSPITAL | Age: 65
LOS: 1 days | End: 2020-04-30

## 2020-04-30 DIAGNOSIS — K94.23 GASTROSTOMY MALFUNCTION: Chronic | ICD-10-CM

## 2020-05-01 ENCOUNTER — OUTPATIENT (OUTPATIENT)
Dept: OUTPATIENT SERVICES | Facility: HOSPITAL | Age: 65
LOS: 1 days | End: 2020-05-01

## 2020-05-01 DIAGNOSIS — K94.23 GASTROSTOMY MALFUNCTION: Chronic | ICD-10-CM

## 2020-05-03 ENCOUNTER — OUTPATIENT (OUTPATIENT)
Dept: OUTPATIENT SERVICES | Facility: HOSPITAL | Age: 65
LOS: 1 days | End: 2020-05-03

## 2020-05-03 DIAGNOSIS — K94.23 GASTROSTOMY MALFUNCTION: Chronic | ICD-10-CM

## 2020-05-04 ENCOUNTER — OUTPATIENT (OUTPATIENT)
Dept: OUTPATIENT SERVICES | Facility: HOSPITAL | Age: 65
LOS: 1 days | End: 2020-05-04

## 2020-05-04 DIAGNOSIS — K94.23 GASTROSTOMY MALFUNCTION: Chronic | ICD-10-CM

## 2020-05-05 ENCOUNTER — OUTPATIENT (OUTPATIENT)
Dept: OUTPATIENT SERVICES | Facility: HOSPITAL | Age: 65
LOS: 1 days | End: 2020-05-05

## 2020-05-05 DIAGNOSIS — K94.23 GASTROSTOMY MALFUNCTION: Chronic | ICD-10-CM

## 2020-06-04 PROBLEM — R13.10 DYSPHAGIA, UNSPECIFIED: Chronic | Status: ACTIVE | Noted: 2018-05-26

## 2020-06-04 PROBLEM — G80.9 CEREBRAL PALSY, UNSPECIFIED: Chronic | Status: ACTIVE | Noted: 2018-05-26

## 2020-06-04 PROBLEM — K29.70 GASTRITIS, UNSPECIFIED, WITHOUT BLEEDING: Chronic | Status: ACTIVE | Noted: 2018-05-26

## 2020-06-04 PROBLEM — K59.00 CONSTIPATION, UNSPECIFIED: Chronic | Status: ACTIVE | Noted: 2018-05-26

## 2020-06-04 PROBLEM — J18.9 PNEUMONIA, UNSPECIFIED ORGANISM: Chronic | Status: ACTIVE | Noted: 2018-05-26

## 2020-06-04 PROBLEM — F79 UNSPECIFIED INTELLECTUAL DISABILITIES: Chronic | Status: ACTIVE | Noted: 2018-05-26

## 2020-06-15 ENCOUNTER — APPOINTMENT (OUTPATIENT)
Dept: UROLOGY | Facility: CLINIC | Age: 65
End: 2020-06-15

## 2020-06-16 ENCOUNTER — APPOINTMENT (OUTPATIENT)
Dept: UROLOGY | Facility: CLINIC | Age: 65
End: 2020-06-16
Payer: MEDICARE

## 2020-06-16 DIAGNOSIS — Z87.19 PERSONAL HISTORY OF OTHER DISEASES OF THE DIGESTIVE SYSTEM: ICD-10-CM

## 2020-06-16 DIAGNOSIS — Z86.69 PERSONAL HISTORY OF OTHER DISEASES OF THE NERVOUS SYSTEM AND SENSE ORGANS: ICD-10-CM

## 2020-06-16 DIAGNOSIS — K94.23 GASTROSTOMY MALFUNCTION: ICD-10-CM

## 2020-06-16 DIAGNOSIS — Z87.440 PERSONAL HISTORY OF URINARY (TRACT) INFECTIONS: ICD-10-CM

## 2020-06-16 DIAGNOSIS — Z87.01 PERSONAL HISTORY OF PNEUMONIA (RECURRENT): ICD-10-CM

## 2020-06-16 PROCEDURE — 99203 OFFICE O/P NEW LOW 30 MIN: CPT | Mod: 95

## 2020-06-17 PROBLEM — Z86.69 HISTORY OF CEREBRAL PALSY: Status: RESOLVED | Noted: 2020-06-17 | Resolved: 2020-06-17

## 2020-06-17 PROBLEM — Z87.19 HISTORY OF GASTRITIS: Status: RESOLVED | Noted: 2020-06-17 | Resolved: 2020-06-17

## 2020-06-17 PROBLEM — K94.23 PEG TUBE MALFUNCTION: Status: RESOLVED | Noted: 2020-06-17 | Resolved: 2020-06-17

## 2020-06-17 PROBLEM — Z87.19 HISTORY OF DYSPHAGIA: Status: RESOLVED | Noted: 2020-06-17 | Resolved: 2020-06-17

## 2020-06-17 PROBLEM — Z87.01 HISTORY OF PNEUMONIA: Status: RESOLVED | Noted: 2020-06-17 | Resolved: 2020-06-17

## 2020-06-17 RX ORDER — ALBUTEROL SULFATE 2.5 MG/3ML
(2.5 MG/3ML) SOLUTION RESPIRATORY (INHALATION)
Qty: 150 | Refills: 0 | Status: ACTIVE | COMMUNITY
Start: 2019-12-29

## 2020-06-17 RX ORDER — PHENOBARBITAL 32.4 MG/1
32.4 TABLET ORAL
Qty: 60 | Refills: 0 | Status: ACTIVE | COMMUNITY
Start: 2020-05-29

## 2020-06-17 RX ORDER — APIXABAN 2.5 MG/1
2.5 TABLET, FILM COATED ORAL
Qty: 60 | Refills: 0 | Status: ACTIVE | COMMUNITY
Start: 2020-06-10

## 2020-06-17 RX ORDER — ESOMEPRAZOLE MAGNESIUM 40 MG/1
40 FOR SUSPENSION ORAL
Qty: 30 | Refills: 0 | Status: ACTIVE | COMMUNITY
Start: 2020-05-11

## 2020-06-17 RX ORDER — POLYMYXIN B SULFATE AND TRIMETHOPRIM 10000; 1 [USP'U]/ML; MG/ML
10000-0.1 SOLUTION OPHTHALMIC
Qty: 10 | Refills: 0 | Status: ACTIVE | COMMUNITY
Start: 2020-01-17

## 2020-06-17 RX ORDER — KETOCONAZOLE 20 MG/G
2 CREAM TOPICAL
Qty: 60 | Refills: 0 | Status: ACTIVE | COMMUNITY
Start: 2020-05-06

## 2020-06-17 NOTE — HISTORY OF PRESENT ILLNESS
[Other Location: e.g. School (Enter Location, City,State)___] : at [unfilled], at the time of the visit. [Other Location: e.g. Home (Enter Location, City,State)___] : at [unfilled] [Formal Caregiver] : formal caregiver [FreeTextEntry1] : The patient-doctor relationship has been established in a face to face fashion via real time video/audio HIPAA compliant communication using telemedicine software.  The patient's identity has been confirmed.  The patient was previously emailed a copy of the telemedicine consent.  They have had a chance to review and has now given verbal consent and has requested care to be assessed and treated through telemedicine and understands there maybe limitations in this process and they may need further follow up care in the office and or hospital settings.   \par  \par Information obtained using [employer]ximity  as unable to connect using Novad[employer]. Verbal consent given on 6/16/20 at 10:56 by Alta Le patients nurse as pt is nonverbal. Pt consult for UTI from [facility] on 4/12/20. Per nurse. Pt was told to follow up with Urology.  Last UTI was in April. Pt was sent to GI bleed and was told he had a UTI.  SInce April he has not had any issues UTI. Prior to April unknown when last UTI was. Pt is incontinent of urine and wheel chair bound.  As per nurse he is unable to void to give a urine sample. They able to get a sample via straight cath if needed. As per nurse CT scan 4/14/20 shows left renal cyst sub millimeter left intra renal calculi and no hydronephrosis. 4/3/20 PSA  0.63. \par

## 2020-06-17 NOTE — REVIEW OF SYSTEMS
[Negative] : Musculoskeletal [FreeTextEntry1] : Unable to obtain as pt is nonverbal [FreeTextEntry2] : uses wheel chair

## 2020-09-24 ENCOUNTER — OUTPATIENT (OUTPATIENT)
Dept: OUTPATIENT SERVICES | Facility: HOSPITAL | Age: 65
LOS: 1 days | End: 2020-09-24

## 2020-09-24 DIAGNOSIS — K94.23 GASTROSTOMY MALFUNCTION: Chronic | ICD-10-CM

## 2020-10-23 ENCOUNTER — EMERGENCY (EMERGENCY)
Facility: HOSPITAL | Age: 65
LOS: 1 days | End: 2020-10-23
Admitting: EMERGENCY MEDICINE
Payer: MEDICARE

## 2020-10-23 DIAGNOSIS — K94.23 GASTROSTOMY MALFUNCTION: Chronic | ICD-10-CM

## 2020-10-23 PROCEDURE — 99284 EMERGENCY DEPT VISIT MOD MDM: CPT

## 2020-11-02 NOTE — H&P ADULT - PSH
Refill request received from pharmacy fro Fluticasone.   LOV 9/15/2020  Last filled 9/3/2020  Medication pended
PEG tube malfunction

## 2020-11-05 ENCOUNTER — OUTPATIENT (OUTPATIENT)
Dept: OUTPATIENT SERVICES | Facility: HOSPITAL | Age: 65
LOS: 1 days | End: 2020-11-05

## 2020-11-05 DIAGNOSIS — K94.23 GASTROSTOMY MALFUNCTION: Chronic | ICD-10-CM

## 2020-11-06 ENCOUNTER — OUTPATIENT (OUTPATIENT)
Dept: OUTPATIENT SERVICES | Facility: HOSPITAL | Age: 65
LOS: 1 days | End: 2020-11-06

## 2020-11-06 DIAGNOSIS — K94.23 GASTROSTOMY MALFUNCTION: Chronic | ICD-10-CM

## 2020-12-21 ENCOUNTER — TRANSCRIPTION ENCOUNTER (OUTPATIENT)
Age: 65
End: 2020-12-21

## 2020-12-23 ENCOUNTER — APPOINTMENT (OUTPATIENT)
Dept: UROLOGY | Facility: CLINIC | Age: 65
End: 2020-12-23

## 2020-12-23 PROBLEM — Z87.440 HISTORY OF URINARY TRACT INFECTION: Status: RESOLVED | Noted: 2020-06-16 | Resolved: 2020-12-23

## 2021-01-04 ENCOUNTER — APPOINTMENT (OUTPATIENT)
Dept: UROLOGY | Facility: CLINIC | Age: 66
End: 2021-01-04
Payer: MEDICARE

## 2021-01-04 PROCEDURE — 99212 OFFICE O/P EST SF 10 MIN: CPT | Mod: 95

## 2021-01-04 NOTE — HISTORY OF PRESENT ILLNESS
[Other Location: e.g. School (Enter Location, City,State)___] : at [unfilled], at the time of the visit. [FreeTextEntry1] : Telemedicine visit using [employer]. Spoke with pts appointment nurse Alta from [facility]. Pt is in his room. Pt is unable to talk. As per nurse no issues or kidney stones. As  per nurse abdominal ultrasound was done 12/29/20 As per nurse kidneys unremarkable. No mention of bladder.

## 2021-01-28 PROCEDURE — 93010 ELECTROCARDIOGRAM REPORT: CPT

## 2021-01-28 PROCEDURE — 74176 CT ABD & PELVIS W/O CONTRAST: CPT | Mod: 26

## 2021-01-28 PROCEDURE — 71045 X-RAY EXAM CHEST 1 VIEW: CPT | Mod: 26

## 2021-01-28 PROCEDURE — 99285 EMERGENCY DEPT VISIT HI MDM: CPT

## 2021-01-29 ENCOUNTER — OUTPATIENT (OUTPATIENT)
Dept: OUTPATIENT SERVICES | Facility: HOSPITAL | Age: 66
LOS: 1 days | End: 2021-01-29

## 2021-01-29 ENCOUNTER — INPATIENT (INPATIENT)
Facility: HOSPITAL | Age: 66
LOS: 4 days | Discharge: HOME | End: 2021-02-03
Admitting: STUDENT IN AN ORGANIZED HEALTH CARE EDUCATION/TRAINING PROGRAM
Payer: MEDICARE

## 2021-01-29 DIAGNOSIS — K94.23 GASTROSTOMY MALFUNCTION: Chronic | ICD-10-CM

## 2021-01-29 PROCEDURE — 76705 ECHO EXAM OF ABDOMEN: CPT | Mod: 26

## 2021-01-29 PROCEDURE — 93970 EXTREMITY STUDY: CPT | Mod: 26

## 2021-01-30 ENCOUNTER — OUTPATIENT (OUTPATIENT)
Dept: OUTPATIENT SERVICES | Facility: HOSPITAL | Age: 66
LOS: 1 days | End: 2021-01-30

## 2021-01-30 DIAGNOSIS — K94.23 GASTROSTOMY MALFUNCTION: Chronic | ICD-10-CM

## 2021-01-31 ENCOUNTER — OUTPATIENT (OUTPATIENT)
Dept: OUTPATIENT SERVICES | Facility: HOSPITAL | Age: 66
LOS: 1 days | End: 2021-01-31

## 2021-01-31 DIAGNOSIS — K94.23 GASTROSTOMY MALFUNCTION: Chronic | ICD-10-CM

## 2021-02-01 ENCOUNTER — OUTPATIENT (OUTPATIENT)
Dept: OUTPATIENT SERVICES | Facility: HOSPITAL | Age: 66
LOS: 1 days | End: 2021-02-01

## 2021-02-01 DIAGNOSIS — K94.23 GASTROSTOMY MALFUNCTION: Chronic | ICD-10-CM

## 2021-02-02 ENCOUNTER — OUTPATIENT (OUTPATIENT)
Dept: OUTPATIENT SERVICES | Facility: HOSPITAL | Age: 66
LOS: 1 days | End: 2021-02-02

## 2021-02-02 DIAGNOSIS — K94.23 GASTROSTOMY MALFUNCTION: Chronic | ICD-10-CM

## 2021-02-03 ENCOUNTER — OUTPATIENT (OUTPATIENT)
Dept: OUTPATIENT SERVICES | Facility: HOSPITAL | Age: 66
LOS: 1 days | End: 2021-02-03

## 2021-02-03 DIAGNOSIS — K94.23 GASTROSTOMY MALFUNCTION: Chronic | ICD-10-CM

## 2021-08-19 ENCOUNTER — APPOINTMENT (OUTPATIENT)
Dept: UROLOGY | Facility: CLINIC | Age: 66
End: 2021-08-19
Payer: MEDICARE

## 2021-08-19 VITALS
TEMPERATURE: 97.3 F | BODY MASS INDEX: 26.5 KG/M2 | DIASTOLIC BLOOD PRESSURE: 87 MMHG | SYSTOLIC BLOOD PRESSURE: 125 MMHG | WEIGHT: 144 LBS | HEART RATE: 71 BPM | HEIGHT: 62 IN

## 2021-08-19 DIAGNOSIS — N20.0 CALCULUS OF KIDNEY: ICD-10-CM

## 2021-08-19 PROCEDURE — 99213 OFFICE O/P EST LOW 20 MIN: CPT

## 2021-08-19 NOTE — PHYSICAL EXAM
[General Appearance - Well Developed] : well developed [General Appearance - Well Nourished] : well nourished [Normal Appearance] : normal appearance [Well Groomed] : well groomed [General Appearance - In No Acute Distress] : no acute distress [Abdomen Tenderness] : non-tender [Abdomen Soft] : soft [Costovertebral Angle Tenderness] : no ~M costovertebral angle tenderness [Urinary Bladder Findings] : the bladder was normal on palpation [] : no respiratory distress [Respiration, Rhythm And Depth] : normal respiratory rhythm and effort [Exaggerated Use Of Accessory Muscles For Inspiration] : no accessory muscle use [Oriented To Time, Place, And Person] : oriented to person, place, and time [Affect] : the affect was normal [Mood] : the mood was normal [Not Anxious] : not anxious [FreeTextEntry1] : in wheel chair

## 2021-08-19 NOTE — HISTORY OF PRESENT ILLNESS
[Other Location: e.g. School (Enter Location, City,State)___] : at [unfilled], at the time of the visit. [FreeTextEntry1] :  Pt is unable to talk. As per aid no issues or kidney stones.  8/16/21 US reviewed. No stones noted.

## 2022-02-22 ENCOUNTER — APPOINTMENT (OUTPATIENT)
Dept: UROLOGY | Facility: CLINIC | Age: 67
End: 2022-02-22
Payer: MEDICARE

## 2022-02-22 VITALS — WEIGHT: 144 LBS | BODY MASS INDEX: 26.5 KG/M2 | TEMPERATURE: 98 F | HEIGHT: 62 IN

## 2022-02-22 PROCEDURE — 99213 OFFICE O/P EST LOW 20 MIN: CPT

## 2022-02-22 NOTE — HISTORY OF PRESENT ILLNESS
[FreeTextEntry1] : Pt comes in follow up renal US. 2/16/22 US shows left renal lesion, not clearly cystic.

## 2022-02-22 NOTE — PHYSICAL EXAM
[General Appearance - Well Developed] : well developed [General Appearance - Well Nourished] : well nourished [Normal Appearance] : normal appearance [Well Groomed] : well groomed [General Appearance - In No Acute Distress] : no acute distress [Abdomen Soft] : soft [Abdomen Tenderness] : non-tender [Costovertebral Angle Tenderness] : no ~M costovertebral angle tenderness [Urinary Bladder Findings] : the bladder was normal on palpation [] : no respiratory distress [Respiration, Rhythm And Depth] : normal respiratory rhythm and effort [Oriented To Time, Place, And Person] : oriented to person, place, and time [Exaggerated Use Of Accessory Muscles For Inspiration] : no accessory muscle use [Affect] : the affect was normal [Mood] : the mood was normal [Not Anxious] : not anxious [FreeTextEntry1] : in wheel chair

## 2022-03-21 ENCOUNTER — APPOINTMENT (OUTPATIENT)
Dept: OPHTHALMOLOGY | Facility: CLINIC | Age: 67
End: 2022-03-21
Payer: MEDICARE

## 2022-03-21 ENCOUNTER — NON-APPOINTMENT (OUTPATIENT)
Age: 67
End: 2022-03-21

## 2022-03-21 PROCEDURE — 92014 COMPRE OPH EXAM EST PT 1/>: CPT

## 2022-08-08 ENCOUNTER — APPOINTMENT (OUTPATIENT)
Dept: CT IMAGING | Facility: CLINIC | Age: 67
End: 2022-08-08

## 2022-08-23 ENCOUNTER — APPOINTMENT (OUTPATIENT)
Dept: UROLOGY | Facility: CLINIC | Age: 67
End: 2022-08-23

## 2022-08-23 VITALS
OXYGEN SATURATION: 96 % | TEMPERATURE: 96.8 F | HEART RATE: 65 BPM | WEIGHT: 151 LBS | BODY MASS INDEX: 27.79 KG/M2 | HEIGHT: 62 IN

## 2022-08-23 DIAGNOSIS — N28.1 CYST OF KIDNEY, ACQUIRED: ICD-10-CM

## 2022-08-23 DIAGNOSIS — N28.9 DISORDER OF KIDNEY AND URETER, UNSPECIFIED: ICD-10-CM

## 2022-08-23 PROCEDURE — 99212 OFFICE O/P EST SF 10 MIN: CPT

## 2022-08-23 RX ORDER — SUCRALFATE 1 G/1
1 TABLET ORAL
Qty: 120 | Refills: 0 | Status: DISCONTINUED | COMMUNITY
Start: 2020-05-20 | End: 2022-08-23

## 2022-08-23 RX ORDER — AMOXICILLIN 500 MG/1
500 CAPSULE ORAL
Qty: 75 | Refills: 0 | Status: DISCONTINUED | COMMUNITY
Start: 2020-05-05 | End: 2022-08-23

## 2022-08-23 NOTE — HISTORY OF PRESENT ILLNESS
[FreeTextEntry1] : Pt comes in follow up renal lesion/cyst. As per aid CT was done yesterday but results are not ready for review.

## 2022-11-10 ENCOUNTER — NON-APPOINTMENT (OUTPATIENT)
Age: 67
End: 2022-11-10

## 2022-11-10 ENCOUNTER — APPOINTMENT (OUTPATIENT)
Dept: OPHTHALMOLOGY | Facility: CLINIC | Age: 67
End: 2022-11-10

## 2022-11-10 PROCEDURE — 99213 OFFICE O/P EST LOW 20 MIN: CPT

## 2022-12-20 NOTE — ED PROVIDER NOTE - CPE EDP SKIN NORM
normal... Bexarotene Pregnancy And Lactation Text: This medication is Pregnancy Category X and should not be given to women who are pregnant or may become pregnant. This medication should not be used if you are breast feeding.

## 2023-11-16 ENCOUNTER — APPOINTMENT (OUTPATIENT)
Dept: OPHTHALMOLOGY | Facility: CLINIC | Age: 68
End: 2023-11-16

## 2024-02-22 ENCOUNTER — TRANSCRIPTION ENCOUNTER (OUTPATIENT)
Age: 69
End: 2024-02-22

## 2024-02-23 ENCOUNTER — APPOINTMENT (OUTPATIENT)
Dept: OPHTHALMOLOGY | Facility: CLINIC | Age: 69
End: 2024-02-23
Payer: MEDICARE

## 2024-02-23 ENCOUNTER — NON-APPOINTMENT (OUTPATIENT)
Age: 69
End: 2024-02-23

## 2024-02-23 PROCEDURE — 99213 OFFICE O/P EST LOW 20 MIN: CPT

## 2024-03-20 ENCOUNTER — TRANSCRIPTION ENCOUNTER (OUTPATIENT)
Age: 69
End: 2024-03-20

## 2024-05-13 NOTE — H&P ADULT - PROBLEM SELECTOR PLAN 3
RF, please. Protonix drip  NPO   GI consult  pt reported to have +guiac in emesis however pt is on liquid iron and suffers from gastritis  may be due to abx induced gastritis as pt has significant hx of admissions for such

## 2024-05-15 ENCOUNTER — TRANSCRIPTION ENCOUNTER (OUTPATIENT)
Age: 69
End: 2024-05-15

## 2024-05-16 ENCOUNTER — TRANSCRIPTION ENCOUNTER (OUTPATIENT)
Age: 69
End: 2024-05-16

## 2024-10-17 ENCOUNTER — APPOINTMENT (OUTPATIENT)
Dept: UROLOGY | Facility: CLINIC | Age: 69
End: 2024-10-17
Payer: MEDICARE

## 2024-10-17 VITALS — TEMPERATURE: 98 F

## 2024-10-17 DIAGNOSIS — N28.1 CYST OF KIDNEY, ACQUIRED: ICD-10-CM

## 2024-10-17 DIAGNOSIS — Z12.5 ENCOUNTER FOR SCREENING FOR MALIGNANT NEOPLASM OF PROSTATE: ICD-10-CM

## 2024-10-17 PROCEDURE — 99214 OFFICE O/P EST MOD 30 MIN: CPT

## 2024-10-17 RX ORDER — LACTOSE-REDUCED FOOD/FIBER 0.07 G-1.5
LIQUID (ML) ORAL
Refills: 0 | Status: ACTIVE | COMMUNITY

## 2024-10-17 RX ORDER — ESOMEPRAZOLE MAGNESIUM 40 MG/1
40 CAPSULE, DELAYED RELEASE ORAL
Refills: 0 | Status: ACTIVE | COMMUNITY

## 2024-10-17 RX ORDER — CYANOCOBALAMIN/FOLIC AC/VIT B6 1-2.5-25MG
2.5-25-1 TABLET ORAL
Refills: 0 | Status: ACTIVE | COMMUNITY

## 2024-10-17 RX ORDER — APIXABAN 2.5 MG/1
2.5 TABLET, FILM COATED ORAL
Refills: 0 | Status: ACTIVE | COMMUNITY

## 2024-10-17 RX ORDER — SUCRALFATE 1 G/1
1 TABLET ORAL
Refills: 0 | Status: ACTIVE | COMMUNITY

## 2024-10-17 RX ORDER — LAMOTRIGINE 100 MG/1
100 TABLET ORAL
Refills: 0 | Status: ACTIVE | COMMUNITY

## 2024-10-17 RX ORDER — DEXTROMETHORPHAN HYDROBROMIDE, GUAIFENESIN 10; 100 MG/5ML; MG/5ML
100-10 LIQUID ORAL
Refills: 0 | Status: ACTIVE | COMMUNITY

## 2024-10-17 RX ORDER — ACETAMINOPHEN 325 MG/10.15ML
325 SOLUTION ORAL
Refills: 0 | Status: ACTIVE | COMMUNITY

## 2024-10-31 ENCOUNTER — APPOINTMENT (OUTPATIENT)
Dept: UROLOGY | Facility: CLINIC | Age: 69
End: 2024-10-31
Payer: MEDICARE

## 2024-10-31 VITALS
DIASTOLIC BLOOD PRESSURE: 84 MMHG | SYSTOLIC BLOOD PRESSURE: 122 MMHG | TEMPERATURE: 97.5 F | HEIGHT: 63 IN | BODY MASS INDEX: 24.27 KG/M2 | HEART RATE: 70 BPM | WEIGHT: 137 LBS

## 2024-10-31 DIAGNOSIS — N28.1 CYST OF KIDNEY, ACQUIRED: ICD-10-CM

## 2024-10-31 DIAGNOSIS — R97.20 ELEVATED PROSTATE, SPECIFIC ANTIGEN [PSA]: ICD-10-CM

## 2024-10-31 PROCEDURE — 99214 OFFICE O/P EST MOD 30 MIN: CPT

## 2024-11-05 LAB
PSA FREE FLD-MCNC: 12 %
PSA FREE SERPL-MCNC: 0.32 NG/ML
PSA SERPL-MCNC: 2.68 NG/ML

## 2024-11-15 ENCOUNTER — RESULT REVIEW (OUTPATIENT)
Age: 69
End: 2024-11-15

## 2024-11-20 NOTE — REVIEW OF SYSTEMS
[Negative] : Endocrine [FreeTextEntry1] : Unable to obtain from pt, Information obtained from aid 1.4

## 2024-12-26 ENCOUNTER — APPOINTMENT (OUTPATIENT)
Dept: UROLOGY | Facility: CLINIC | Age: 69
End: 2024-12-26
Payer: MEDICARE

## 2024-12-26 VITALS
TEMPERATURE: 97.3 F | SYSTOLIC BLOOD PRESSURE: 136 MMHG | WEIGHT: 137 LBS | HEART RATE: 74 BPM | DIASTOLIC BLOOD PRESSURE: 90 MMHG | HEIGHT: 63 IN | BODY MASS INDEX: 24.27 KG/M2

## 2024-12-26 DIAGNOSIS — N28.1 CYST OF KIDNEY, ACQUIRED: ICD-10-CM

## 2024-12-26 DIAGNOSIS — R97.20 ELEVATED PROSTATE, SPECIFIC ANTIGEN [PSA]: ICD-10-CM

## 2024-12-26 PROCEDURE — 99214 OFFICE O/P EST MOD 30 MIN: CPT

## 2025-02-21 ENCOUNTER — APPOINTMENT (OUTPATIENT)
Dept: OPHTHALMOLOGY | Facility: CLINIC | Age: 70
End: 2025-02-21

## 2025-06-26 ENCOUNTER — APPOINTMENT (OUTPATIENT)
Dept: UROLOGY | Facility: CLINIC | Age: 70
End: 2025-06-26